# Patient Record
Sex: MALE | Race: WHITE | Employment: OTHER | URBAN - METROPOLITAN AREA
[De-identification: names, ages, dates, MRNs, and addresses within clinical notes are randomized per-mention and may not be internally consistent; named-entity substitution may affect disease eponyms.]

---

## 2023-03-23 ENCOUNTER — APPOINTMENT (OUTPATIENT)
Dept: GENERAL RADIOLOGY | Age: 83
DRG: 517 | End: 2023-03-23
Attending: EMERGENCY MEDICINE
Payer: MEDICARE

## 2023-03-23 ENCOUNTER — APPOINTMENT (OUTPATIENT)
Dept: MRI IMAGING | Age: 83
DRG: 517 | End: 2023-03-23
Attending: FAMILY MEDICINE
Payer: MEDICARE

## 2023-03-23 ENCOUNTER — HOSPITAL ENCOUNTER (INPATIENT)
Age: 83
LOS: 4 days | Discharge: HOME | DRG: 517 | End: 2023-03-27
Attending: EMERGENCY MEDICINE | Admitting: FAMILY MEDICINE
Payer: MEDICARE

## 2023-03-23 ENCOUNTER — APPOINTMENT (OUTPATIENT)
Dept: CT IMAGING | Age: 83
DRG: 517 | End: 2023-03-23
Attending: EMERGENCY MEDICINE
Payer: MEDICARE

## 2023-03-23 DIAGNOSIS — S22.000A COMPRESSION FX, THORACIC SPINE, CLOSED, INITIAL ENCOUNTER (HCC): ICD-10-CM

## 2023-03-23 DIAGNOSIS — S32.030A COMPRESSION FRACTURE OF L3 VERTEBRA, INITIAL ENCOUNTER (HCC): ICD-10-CM

## 2023-03-23 DIAGNOSIS — S32.020A COMPRESSION FRACTURE OF L2 VERTEBRA, INITIAL ENCOUNTER (HCC): Primary | ICD-10-CM

## 2023-03-23 PROBLEM — S32.009A CLOSED FRACTURE DISLOCATION OF LUMBAR SPINE (HCC): Status: ACTIVE | Noted: 2023-03-23

## 2023-03-23 LAB
ALBUMIN SERPL-MCNC: 3.4 G/DL (ref 3.5–5)
ALBUMIN/GLOB SERPL: 0.9 (ref 1.1–2.2)
ALP SERPL-CCNC: 98 U/L (ref 45–117)
ALT SERPL-CCNC: 22 U/L (ref 12–78)
ANION GAP SERPL CALC-SCNC: 1 MMOL/L (ref 5–15)
APPEARANCE UR: CLEAR
AST SERPL-CCNC: 18 U/L (ref 15–37)
BACTERIA URNS QL MICRO: NEGATIVE /HPF
BASOPHILS # BLD: 0 K/UL (ref 0–0.1)
BASOPHILS NFR BLD: 1 % (ref 0–1)
BILIRUB SERPL-MCNC: 0.5 MG/DL (ref 0.2–1)
BILIRUB UR QL: NEGATIVE
BUN SERPL-MCNC: 24 MG/DL (ref 6–20)
BUN/CREAT SERPL: 33 (ref 12–20)
CALCIUM SERPL-MCNC: 9.2 MG/DL (ref 8.5–10.1)
CHLORIDE SERPL-SCNC: 104 MMOL/L (ref 97–108)
CO2 SERPL-SCNC: 34 MMOL/L (ref 21–32)
COLOR UR: ABNORMAL
CREAT SERPL-MCNC: 0.73 MG/DL (ref 0.7–1.3)
DIFFERENTIAL METHOD BLD: ABNORMAL
EOSINOPHIL # BLD: 0.2 K/UL (ref 0–0.4)
EOSINOPHIL NFR BLD: 2 % (ref 0–7)
EPITH CASTS URNS QL MICRO: ABNORMAL /LPF
ERYTHROCYTE [DISTWIDTH] IN BLOOD BY AUTOMATED COUNT: 13.3 % (ref 11.5–14.5)
GLOBULIN SER CALC-MCNC: 3.6 G/DL (ref 2–4)
GLUCOSE SERPL-MCNC: 95 MG/DL (ref 65–100)
GLUCOSE UR STRIP.AUTO-MCNC: NEGATIVE MG/DL
HCT VFR BLD AUTO: 38.9 % (ref 36.6–50.3)
HGB BLD-MCNC: 13 G/DL (ref 12.1–17)
HGB UR QL STRIP: ABNORMAL
HYALINE CASTS URNS QL MICRO: ABNORMAL /LPF (ref 0–5)
IMM GRANULOCYTES # BLD AUTO: 0 K/UL (ref 0–0.04)
IMM GRANULOCYTES NFR BLD AUTO: 0 % (ref 0–0.5)
KETONES UR QL STRIP.AUTO: NEGATIVE MG/DL
LEUKOCYTE ESTERASE UR QL STRIP.AUTO: ABNORMAL
LYMPHOCYTES # BLD: 0.8 K/UL (ref 0.8–3.5)
LYMPHOCYTES NFR BLD: 10 % (ref 12–49)
MCH RBC QN AUTO: 30.1 PG (ref 26–34)
MCHC RBC AUTO-ENTMCNC: 33.4 G/DL (ref 30–36.5)
MCV RBC AUTO: 90 FL (ref 80–99)
MONOCYTES # BLD: 0.7 K/UL (ref 0–1)
MONOCYTES NFR BLD: 8 % (ref 5–13)
NEUTS SEG # BLD: 6.6 K/UL (ref 1.8–8)
NEUTS SEG NFR BLD: 79 % (ref 32–75)
NITRITE UR QL STRIP.AUTO: NEGATIVE
NRBC # BLD: 0 K/UL (ref 0–0.01)
NRBC BLD-RTO: 0 PER 100 WBC
PH UR STRIP: 6.5 (ref 5–8)
PLATELET # BLD AUTO: 254 K/UL (ref 150–400)
PMV BLD AUTO: 10.3 FL (ref 8.9–12.9)
POTASSIUM SERPL-SCNC: 3.3 MMOL/L (ref 3.5–5.1)
PROT SERPL-MCNC: 7 G/DL (ref 6.4–8.2)
PROT UR STRIP-MCNC: NEGATIVE MG/DL
RBC # BLD AUTO: 4.32 M/UL (ref 4.1–5.7)
RBC #/AREA URNS HPF: >100 /HPF (ref 0–5)
SODIUM SERPL-SCNC: 139 MMOL/L (ref 136–145)
SP GR UR REFRACTOMETRY: 1.02 (ref 1–1.03)
UR CULT HOLD, URHOLD: NORMAL
UROBILINOGEN UR QL STRIP.AUTO: 1 EU/DL (ref 0.2–1)
WBC # BLD AUTO: 8.4 K/UL (ref 4.1–11.1)
WBC URNS QL MICRO: ABNORMAL /HPF (ref 0–4)

## 2023-03-23 PROCEDURE — 65270000029 HC RM PRIVATE

## 2023-03-23 PROCEDURE — 81001 URINALYSIS AUTO W/SCOPE: CPT

## 2023-03-23 PROCEDURE — 65270000032 HC RM SEMIPRIVATE

## 2023-03-23 PROCEDURE — 36415 COLL VENOUS BLD VENIPUNCTURE: CPT

## 2023-03-23 PROCEDURE — 74011250637 HC RX REV CODE- 250/637: Performed by: FAMILY MEDICINE

## 2023-03-23 PROCEDURE — 99285 EMERGENCY DEPT VISIT HI MDM: CPT

## 2023-03-23 PROCEDURE — 72100 X-RAY EXAM L-S SPINE 2/3 VWS: CPT

## 2023-03-23 PROCEDURE — 85025 COMPLETE CBC W/AUTO DIFF WBC: CPT

## 2023-03-23 PROCEDURE — 80053 COMPREHEN METABOLIC PANEL: CPT

## 2023-03-23 PROCEDURE — 73562 X-RAY EXAM OF KNEE 3: CPT

## 2023-03-23 PROCEDURE — 72148 MRI LUMBAR SPINE W/O DYE: CPT

## 2023-03-23 PROCEDURE — 70450 CT HEAD/BRAIN W/O DYE: CPT

## 2023-03-23 PROCEDURE — 74011000250 HC RX REV CODE- 250: Performed by: FAMILY MEDICINE

## 2023-03-23 PROCEDURE — 93005 ELECTROCARDIOGRAM TRACING: CPT

## 2023-03-23 RX ORDER — SODIUM CHLORIDE 0.9 % (FLUSH) 0.9 %
5-40 SYRINGE (ML) INJECTION EVERY 8 HOURS
Status: DISCONTINUED | OUTPATIENT
Start: 2023-03-23 | End: 2023-03-27 | Stop reason: HOSPADM

## 2023-03-23 RX ORDER — OXYCODONE HYDROCHLORIDE 5 MG/1
5 TABLET ORAL
Status: DISCONTINUED | OUTPATIENT
Start: 2023-03-23 | End: 2023-03-27 | Stop reason: HOSPADM

## 2023-03-23 RX ORDER — ACETAMINOPHEN 650 MG/1
650 SUPPOSITORY RECTAL
Status: DISCONTINUED | OUTPATIENT
Start: 2023-03-23 | End: 2023-03-27 | Stop reason: HOSPADM

## 2023-03-23 RX ORDER — ONDANSETRON 2 MG/ML
4 INJECTION INTRAMUSCULAR; INTRAVENOUS
Status: DISCONTINUED | OUTPATIENT
Start: 2023-03-23 | End: 2023-03-27 | Stop reason: HOSPADM

## 2023-03-23 RX ORDER — POTASSIUM CHLORIDE 750 MG/1
40 TABLET, FILM COATED, EXTENDED RELEASE ORAL
Status: COMPLETED | OUTPATIENT
Start: 2023-03-23 | End: 2023-03-23

## 2023-03-23 RX ORDER — ACETAMINOPHEN 325 MG/1
650 TABLET ORAL
Status: DISCONTINUED | OUTPATIENT
Start: 2023-03-23 | End: 2023-03-27 | Stop reason: HOSPADM

## 2023-03-23 RX ORDER — SODIUM CHLORIDE 0.9 % (FLUSH) 0.9 %
5-40 SYRINGE (ML) INJECTION AS NEEDED
Status: DISCONTINUED | OUTPATIENT
Start: 2023-03-23 | End: 2023-03-27 | Stop reason: HOSPADM

## 2023-03-23 RX ORDER — ONDANSETRON 4 MG/1
4 TABLET, ORALLY DISINTEGRATING ORAL
Status: DISCONTINUED | OUTPATIENT
Start: 2023-03-23 | End: 2023-03-27 | Stop reason: HOSPADM

## 2023-03-23 RX ORDER — POLYETHYLENE GLYCOL 3350 17 G/17G
17 POWDER, FOR SOLUTION ORAL DAILY PRN
Status: DISCONTINUED | OUTPATIENT
Start: 2023-03-23 | End: 2023-03-27 | Stop reason: HOSPADM

## 2023-03-23 RX ADMIN — POTASSIUM CHLORIDE 40 MEQ: 750 TABLET, FILM COATED, EXTENDED RELEASE ORAL at 14:30

## 2023-03-23 RX ADMIN — SODIUM CHLORIDE, PRESERVATIVE FREE 10 ML: 5 INJECTION INTRAVENOUS at 21:46

## 2023-03-23 NOTE — ED NOTES
RN attempted to straight cath patient for urine but patient had recently urinated in brief. No urine collected.

## 2023-03-23 NOTE — ED TRIAGE NOTES
Pt arrives EMS from [facility] for a GLF with back pain. 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 Arrives in cervical collar and VSS. Feet are both swollen but pt states its ongoing but unsure the cause.  and bp was 123/75 for EMS. A&Ox4. Denied hitting head, LOC, neck pain, or chest pain.

## 2023-03-23 NOTE — ED NOTES
Has a final transfer review been performed?  Yes    Reason for Admission: Compression fractures  Patient comes from: [facility]  Mental Status: Alert and oriented  ADL:partial assistance  Ambulation:wheelchair bound  Pertinent Info/Safety Concerns: Fall risk  COVID Status: Not Tested  MEWS Score: 1  Sinus Rhythm, Sinus Gerhard  Vitals:    03/23/23 1015 03/23/23 1230 03/23/23 1245 03/23/23 1301   BP: 129/72 122/70 110/74 123/65   Pulse:  66 60 (!) 57   Resp:  14 12 12   Temp:       SpO2:  96% 97% 97%     Lines:   Peripheral IV 03/23/23 Anterior;Proximal;Right Forearm (Active)   Site Assessment Clean, dry, & intact 03/23/23 0941   Phlebitis Assessment 0 03/23/23 0941   Infiltration Assessment 0 03/23/23 0941   Dressing Status Clean, dry, & intact 03/23/23 0941   Dressing Type Tape;Transparent 03/23/23 0941   Hub Color/Line Status Pink;Flushed 03/23/23 0941   Action Taken Blood drawn 03/23/23 0941   Alcohol Cap Used No 03/23/23 8398      Transport mode: Stretcher    [employer]  being transferred to [facility] for routine progression of care     \"Notification of etransfer note given to Zoya

## 2023-03-23 NOTE — ED PROVIDER NOTES
this is an 46-year-old male who lives in a nursing home and was attempting to get out of bed this morning. He lost his balance and fell forward. He struck both knees and his left elbow. There is no history of the patient striking his head. He did not lose consciousness and does not complain of any head pain or neck pain. He is alert and oriented x3. He is moving all extremities. He is complaining of pain in both knees and his lower back. He was unable to get up because of pain in the lower back. As long as he is perfectly quiet he is having no discomfort. He denies any chest pain, shortness of breath, nausea or vomiting and no abdominal pain. He had no symptoms prior to attempting to get out of bed that may have precipitated this fall. No past medical history on file. No past surgical history on file. No family history on file. Social History     Socioeconomic History    Marital status:      Spouse name: Not on file    Number of children: Not on file    Years of education: Not on file    Highest education level: Not on file   Occupational History    Not on file   Tobacco Use    Smoking status: Not on file    Smokeless tobacco: Not on file   Substance and Sexual Activity    Alcohol use: Not on file    Drug use: Not on file    Sexual activity: Not on file   Other Topics Concern    Not on file   Social History Narrative    Not on file     Social Determinants of Health     Financial Resource Strain: Not on file   Food Insecurity: Not on file   Transportation Needs: Not on file   Physical Activity: Not on file   Stress: Not on file   Social Connections: Not on file   Intimate Partner Violence: Not on file   Housing Stability: Not on file         ALLERGIES: Patient has no allergy information on record. Review of Systems   Constitutional:  Negative for activity change, appetite change and fatigue. HENT:  Negative for ear pain, facial swelling, sore throat and trouble swallowing. Eyes:  Negative for pain, discharge and visual disturbance. Respiratory:  Negative for chest tightness, shortness of breath and wheezing. Cardiovascular:  Negative for chest pain and palpitations. Gastrointestinal:  Negative for abdominal pain, blood in stool, nausea and vomiting. Genitourinary:  Negative for difficulty urinating, flank pain and hematuria. Musculoskeletal:  Negative for arthralgias, joint swelling, myalgias and neck pain. Pain in both knees and lower back. See HPI   Skin:  Negative for color change and rash. Neurological:  Negative for dizziness, weakness, numbness and headaches. Hematological:  Negative for adenopathy. Does not bruise/bleed easily. Psychiatric/Behavioral:  Negative for behavioral problems, confusion and sleep disturbance. All other systems reviewed and are negative. Vitals:    03/23/23 0810 03/23/23 0811 03/23/23 0830   BP:  127/76 128/72   Pulse: 63 (!) 59 61   Resp: 13 12 13   Temp:  99.5 °F (37.5 °C)    SpO2:  99% 98%            Physical Exam  Vitals and nursing note reviewed. Constitutional:       General: He is not in acute distress. Appearance: He is well-developed. He is not ill-appearing or diaphoretic. HENT:      Head: Normocephalic and atraumatic. Comments: There is no evidence of any head injury or neck injury. Patient has no pain and no restricted motion. He voices no other complaints relative to the head and the neck. Nose: Nose normal.      Mouth/Throat:      Mouth: Mucous membranes are moist.   Eyes:      General: No scleral icterus. Conjunctiva/sclera: Conjunctivae normal.      Pupils: Pupils are equal, round, and reactive to light. Neck:      Thyroid: No thyromegaly. Vascular: No JVD. Trachea: No tracheal deviation. Comments: No carotid bruits noted. Cardiovascular:      Rate and Rhythm: Normal rate and regular rhythm. Heart sounds: Normal heart sounds. No murmur heard.     No friction rub. No gallop. Pulmonary:      Effort: Pulmonary effort is normal. No respiratory distress. Breath sounds: Normal breath sounds. No wheezing or rales. Chest:      Chest wall: No tenderness. Abdominal:      General: Bowel sounds are normal. There is no distension. Palpations: Abdomen is soft. There is no mass. Tenderness: There is no abdominal tenderness. There is no guarding or rebound. Musculoskeletal:         General: No tenderness. Normal range of motion. Cervical back: Normal range of motion and neck supple. Comments: There is some mild discomfort to palpation of the lower back when the patient attempts to move. Lymphadenopathy:      Cervical: No cervical adenopathy. Skin:     General: Skin is warm and dry. Capillary Refill: Capillary refill takes 2 to 3 seconds. Coloration: Skin is pale. Findings: No erythema or rash. Neurological:      Mental Status: He is alert and oriented to person, place, and time. Cranial Nerves: No cranial nerve deficit. Coordination: Coordination normal.      Deep Tendon Reflexes: Reflexes are normal and symmetric. Psychiatric:         Behavior: Behavior normal.         Thought Content: Thought content normal.         Judgment: Judgment normal.        Medical Decision Making  This is an 71-year-old male who presents with fall at the nursing home and trying to get out of bed. He fell forward. He has no history of any head injury or neck injury. He is complaining of pain in both knees. There is no complaint of hip pain. He does have some back pain when he attempts to get up. He voices no other acute complaints presently. There were no complaints prior to trying to get out of bed. We will obtain basic labs and x-rays of the lower back and knees.   We will attempt to have him ambulate after labs and x rays      Problems Addressed:  Compression fracture of L2 vertebra, initial encounter [facility]): acute illness or injury  Compression fracture of L3 vertebra, initial encounter [facility]): acute illness or injury  Compression fx, thoracic spine, closed, initial encounter [facility]): acute illness or injury    Amount and/or Complexity of Data Reviewed  Labs: ordered. Decision-making details documented in ED Course. Radiology: ordered and independent interpretation performed. Decision-making details documented in ED Course. ECG/medicine tests: ordered. Risk  Decision regarding hospitalization. ED Course as of 03/23/23 1206   Thu Mar 23, 2023   1027 EDMD EKG interpretation: There is a sinus rhythm at 63 beats a minute. Axis is normal and intervals appear normal.  No ectopy or acute changes noted [RP]   1151 XR SPINE LUMB 2 OR 3 V [RP]      ED Course User Index  [RP] Aileen Gutierrez MD       Procedures    Patient has a history of an L1 compression fracture in September of last year. There is no indication he had more than 1 at that time. Today he is got compression fractures of T12 as well as L4. He is having back pain when he attempts to get up and states that he is not able to get up and was the reason that he came to the hospital.    Patient is noted on x-ray today to have kyphoplasties done on L1 and L4. He has significant compressions and T12, L2 and to a lesser degree L3. He is unable to sit up on the stretcher at this time. Many movement produces increasing pain in his lower back. It is unclear whether these compression fractures were seen today outside of the ones that have been kyphoplasty are new. He clearly is not able to go back to the facility at this time. We will consult Ortho in the hospitalist.  He may require kyphoplasty more acutely if these are new fractures. Perfect Serve Consult for Admission  12:13 PM    ED Room Number: ER09/09  Patient Name and age: Raymundo Patel 81 y.o.  male  Working Diagnosis:   1. Compression fracture of L2 vertebra, initial encounter ([address]    2.  Compression fracture of L3 vertebra, initial encounter ([facility] [address]    3.  Compression fx, thoracic spine, closed, initial encounter ([facility] [address]        COVID-19 Suspicion:  no  Sepsis present:  no  Reassessment needed: no  Code Status:  Full Code  Readmission: no  Isolation Requirements:  no  Recommended Level of Care:  med/surg  Department: [facility] Adult ED - [phone]  Admitting Provider: Hospitalist    Other:  Ortho consulted/ Patient unable to get off bed

## 2023-03-23 NOTE — H&P
History and Physical    Date of Service:  3/23/2023  Primary Care Provider: None  Source of information: The patient and Chart review    Chief Complaint: Fall      History of Presenting Illness:   Gino Thornton is a 81 y.o. male who presents from his long-term care facility after he fell while attempting to get out of bed. Patient lost his balance and fell forward, he struck both knees as well as left elbow. Denies any head injury, denies any loss of consciousness. Patient presented to the emergency room for further evaluation, imaging includes CT of the head which shows no acute intracranial pathology, x-ray of both knees negative for acute fracture or dislocation. Lumbar spine x-ray shows T12, L2 and L3 compression fracture, age indeterminate. In the ER, while attempting to mobilize the patient, patient with significant pain and unable to move. Hospitalist service requested admit the patient for further evaluation management. The patient denies any headache, blurry vision, sore throat, trouble swallowing, trouble with speech, chest pain, SOB, cough, fever, chills, N/V/D, abd pain, urinary symptoms, constipation, recent travels, sick contacts, focal or generalized neurological symptoms, falls, injuries, rashes, contact with COVID-19 diagnosed patients, hematemesis, melena, hemoptysis, hematuria, rashes, denies starting any new medications and denies any other concerns or problems besides as mentioned above. REVIEW OF SYSTEMS:  A comprehensive review of systems was negative except for that written in the History of Present Illness. No past medical history on file. No past surgical history on file. Prior to Admission medications    Not on File     Not on File   No family history on file.    Social History:     Social Determinants of Health     Tobacco Use: Not on file   Alcohol Use: Not on file   Financial Resource Strain: Not on file   Food Insecurity: Not on file   Transportation Needs: Not on file   Physical Activity: Not on file   Stress: Not on file   Social Connections: Not on file   Intimate Partner Violence: Not on file   Depression: Not on file   Housing Stability: Not on file        Medications were reconciled to the best of my ability given all available resources at the time of admission. Route is PO if not otherwise noted. Family and social history were personally reviewed, all pertinent and relevant details are outlined as above.     Objective:   Visit Vitals  /72   Pulse 61   Temp 99.5 °F (37.5 °C)   Resp 13   SpO2 98%      O2 Device: None (Room air)    PHYSICAL EXAM:   General: Alert x oriented x 3, awake, no acute distress,   HEENT: PEERL, EOMI, moist mucus membranes  Neck: Supple, no JVD, no meningeal signs  Chest: Clear to auscultation bilaterally   CVS: RRR, S1 S2 heard, no murmurs/rubs/gallops  Abd: Soft, non-tender, non-distended, +bowel sounds   Ext: No clubbing, no cyanosis, no edema  Neuro/Psych: Pleasant mood and affect, CN 2-12 grossly intact, sensory grossly within normal limit, Strength 5/5 in all extremities, DTR 1+ x 4  Cap refill: Brisk, less than 3 seconds  Pulses: 2+, symmetric in all extremities  Skin: Warm, dry, without rashes or lesions    Data Review:   I have independently reviewed and interpreted patient's lab and all other diagnostic data    Abnormal Labs Reviewed   CBC WITH AUTOMATED DIFF - Abnormal; Notable for the following components:       Result Value    NEUTROPHILS 79 (*)     LYMPHOCYTES 10 (*)     All other components within normal limits   METABOLIC PANEL, COMPREHENSIVE - Abnormal; Notable for the following components:    Potassium 3.3 (*)     CO2 34 (*)     Anion gap 1 (*)     BUN 24 (*)     BUN/Creatinine ratio 33 (*)     Albumin 3.4 (*)     A-G Ratio 0.9 (*)     All other components within normal limits       All Micro Results       None            IMAGING:   XR SPINE LUMB 2 OR 3 V   Final Result   T12, L2, and L3 compression fractures, age indeterminate. XR KNEE RT 3 V   Final Result   No fracture or dislocation. Right distal femur hardware noted. .      XR KNEE LT 3 V   Final Result   No fracture or dislocation. Right distal femur hardware noted. .      CT HEAD WO CONT   Final Result   No acute intracranial hemorrhage or infarct. ECG/ECHO:    Results for orders placed or performed during the hospital encounter of 03/23/23   EKG, 12 LEAD, INITIAL   Result Value Ref Range    Ventricular Rate 63 BPM    Atrial Rate 63 BPM    P-R Interval 148 ms    QRS Duration 86 ms    Q-T Interval 392 ms    QTC Calculation (Bezet) 401 ms    Calculated P Axis 39 degrees    Calculated R Axis 30 degrees    Calculated T Axis 23 degrees    Diagnosis       Normal sinus rhythm  Nonspecific T wave abnormality  Abnormal ECG  No previous ECGs available            Notes reviewed from all clinical/nonclinical/nursing services involved in patient's clinical care. Care coordination discussions were held with appropriate clinical/nonclinical/ nursing providers based on care coordination needs. Assessment:   Given the patient's current clinical presentation, there is a high level of concern for decompensation if discharged from the emergency department. Complex decision making was performed, which includes reviewing the patient's available past medical records, laboratory results, and imaging studies.     Active Problems:    Closed fracture dislocation of lumbar spine ([facility] [address] (3/23/2023)        Plan:     S/p mechanical fall  -Work-up include CT of the head, bilateral knee which shows no acute pathology  -Lumbar spine x-ray shows T12, L2 and L3 compression fractures age-indeterminate  -Patient with significant pain and unable to mobilize  -Admit for pain control, PT to evaluate when patient able to participate    Spinal compression fracture  -Age indeterminate compression fractures of the T12, L2 and L3  -Pain management, check MRI of the spine to evaluate for acuity of the fracture  -If acute fracture identified, consult IR for possible kyphoplasty  -Continue pain management, mobilize as able with PT    Hypokalemia  -Replace potassium    No other medical history noted in the chart and also medication list not available at this time. We will review medications once med rec is completed. Estimated length of stay is 2 midnights. DIET: No diet orders on file   ISOLATION PRECAUTIONS: There are currently no Active Isolations  CODE STATUS: No Order   DVT PROPHYLAXIS: SCDs  FUNCTIONAL STATUS PRIOR TO HOSPITALIZATION: Fully active and ambulatory; able to carry on all self-care without restriction. Ambulatory status/function: By self   EARLY MOBILITY ASSESSMENT: Recommend an assessment from physical therapy and/or occupational therapy  ANTICIPATED DISCHARGE: 24-48 hours. ANTICIPATED DISPOSITION: LTC  EMERGENCY CONTACT/SURROGATE DECISION MAKER:     CRITICAL CARE WAS PERFORMED FOR THIS ENCOUNTER: NO.      Signed By: Esther Lozano MD     March 23, 2023         Please note that this dictation may have been completed with Dragon, the computer voice recognition software. Quite often unanticipated grammatical, syntax, homophones, and other interpretive errors are inadvertently transcribed by the computer software. Please disregard these errors. Please excuse any errors that have escaped final proofreading.

## 2023-03-24 ENCOUNTER — HOSPITAL ENCOUNTER (INPATIENT)
Dept: INTERVENTIONAL RADIOLOGY/VASCULAR | Age: 83
Discharge: HOME | DRG: 517 | End: 2023-03-24
Attending: PHYSICIAN ASSISTANT
Payer: MEDICARE

## 2023-03-24 VITALS
SYSTOLIC BLOOD PRESSURE: 94 MMHG | DIASTOLIC BLOOD PRESSURE: 57 MMHG | TEMPERATURE: 98.1 F | OXYGEN SATURATION: 96 % | HEART RATE: 57 BPM | RESPIRATION RATE: 8 BRPM

## 2023-03-24 LAB
INR PPP: 1.1 (ref 0.9–1.1)
PROTHROMBIN TIME: 11.1 SEC (ref 9–11.1)

## 2023-03-24 PROCEDURE — 2709999900 HC NON-CHARGEABLE SUPPLY

## 2023-03-24 PROCEDURE — 65270000029 HC RM PRIVATE

## 2023-03-24 PROCEDURE — 74011000250 HC RX REV CODE- 250: Performed by: FAMILY MEDICINE

## 2023-03-24 PROCEDURE — 74011000250 HC RX REV CODE- 250: Performed by: STUDENT IN AN ORGANIZED HEALTH CARE EDUCATION/TRAINING PROGRAM

## 2023-03-24 PROCEDURE — 36415 COLL VENOUS BLD VENIPUNCTURE: CPT

## 2023-03-24 PROCEDURE — 77030021783 HC SYS CEM DEL MEDT -D

## 2023-03-24 PROCEDURE — 85610 PROTHROMBIN TIME: CPT

## 2023-03-24 PROCEDURE — 0QU03JZ SUPPLEMENT LUMBAR VERTEBRA WITH SYNTHETIC SUBSTITUTE, PERCUTANEOUS APPROACH: ICD-10-PCS | Performed by: STUDENT IN AN ORGANIZED HEALTH CARE EDUCATION/TRAINING PROGRAM

## 2023-03-24 PROCEDURE — 77030007884 HC KT SPN FX KYPH -I2

## 2023-03-24 PROCEDURE — 77030034843 HC TAMP SPN BN INFL EXP II KYPH -H

## 2023-03-24 PROCEDURE — 0QS03ZZ REPOSITION LUMBAR VERTEBRA, PERCUTANEOUS APPROACH: ICD-10-PCS | Performed by: STUDENT IN AN ORGANIZED HEALTH CARE EDUCATION/TRAINING PROGRAM

## 2023-03-24 PROCEDURE — C1713 ANCHOR/SCREW BN/BN,TIS/BN: HCPCS

## 2023-03-24 PROCEDURE — 77030003666 HC NDL SPINAL BD -A

## 2023-03-24 PROCEDURE — 65270000032 HC RM SEMIPRIVATE

## 2023-03-24 PROCEDURE — 74011000636 HC RX REV CODE- 636: Performed by: STUDENT IN AN ORGANIZED HEALTH CARE EDUCATION/TRAINING PROGRAM

## 2023-03-24 PROCEDURE — 77030021782 HC SYS CEM CART DEL KYPH -C

## 2023-03-24 PROCEDURE — 22515 PERQ VERTEBRAL AUGMENTATION: CPT

## 2023-03-24 PROCEDURE — 74011250636 HC RX REV CODE- 250/636: Performed by: STUDENT IN AN ORGANIZED HEALTH CARE EDUCATION/TRAINING PROGRAM

## 2023-03-24 RX ORDER — FLUMAZENIL 0.1 MG/ML
0.5 INJECTION INTRAVENOUS
Status: DISCONTINUED | OUTPATIENT
Start: 2023-03-24 | End: 2023-03-24 | Stop reason: ALTCHOICE

## 2023-03-24 RX ORDER — SODIUM CHLORIDE 9 MG/ML
25 INJECTION, SOLUTION INTRAVENOUS
Status: DISCONTINUED | OUTPATIENT
Start: 2023-03-24 | End: 2023-03-24 | Stop reason: ALTCHOICE

## 2023-03-24 RX ORDER — KETOROLAC TROMETHAMINE 30 MG/ML
15-30 INJECTION, SOLUTION INTRAMUSCULAR; INTRAVENOUS
Status: COMPLETED | OUTPATIENT
Start: 2023-03-24 | End: 2023-03-24

## 2023-03-24 RX ORDER — LIDOCAINE HYDROCHLORIDE 10 MG/ML
30 INJECTION INFILTRATION; PERINEURAL
Status: COMPLETED | OUTPATIENT
Start: 2023-03-24 | End: 2023-03-24

## 2023-03-24 RX ORDER — BUPIVACAINE HYDROCHLORIDE 5 MG/ML
30 INJECTION, SOLUTION EPIDURAL; INTRACAUDAL
Status: COMPLETED | OUTPATIENT
Start: 2023-03-24 | End: 2023-03-24

## 2023-03-24 RX ORDER — NALOXONE HYDROCHLORIDE 0.4 MG/ML
0.4 INJECTION, SOLUTION INTRAMUSCULAR; INTRAVENOUS; SUBCUTANEOUS
Status: DISCONTINUED | OUTPATIENT
Start: 2023-03-24 | End: 2023-03-24 | Stop reason: ALTCHOICE

## 2023-03-24 RX ORDER — FENTANYL CITRATE 50 UG/ML
25-200 INJECTION, SOLUTION INTRAMUSCULAR; INTRAVENOUS
Status: DISCONTINUED | OUTPATIENT
Start: 2023-03-24 | End: 2023-03-24 | Stop reason: ALTCHOICE

## 2023-03-24 RX ORDER — MIDAZOLAM HYDROCHLORIDE 1 MG/ML
.5-5 INJECTION, SOLUTION INTRAMUSCULAR; INTRAVENOUS
Status: DISCONTINUED | OUTPATIENT
Start: 2023-03-24 | End: 2023-03-24 | Stop reason: ALTCHOICE

## 2023-03-24 RX ORDER — DIPHENHYDRAMINE HYDROCHLORIDE 50 MG/ML
25-50 INJECTION, SOLUTION INTRAMUSCULAR; INTRAVENOUS
Status: COMPLETED | OUTPATIENT
Start: 2023-03-24 | End: 2023-03-24

## 2023-03-24 RX ADMIN — DIPHENHYDRAMINE HYDROCHLORIDE 50 MG: 50 INJECTION, SOLUTION INTRAMUSCULAR; INTRAVENOUS at 13:20

## 2023-03-24 RX ADMIN — MIDAZOLAM 1.5 MG: 1 INJECTION INTRAMUSCULAR; INTRAVENOUS at 14:02

## 2023-03-24 RX ADMIN — SODIUM CHLORIDE 25 ML/HR: 9 INJECTION, SOLUTION INTRAVENOUS at 13:14

## 2023-03-24 RX ADMIN — CEFAZOLIN 2 G: 1 INJECTION, POWDER, FOR SOLUTION INTRAMUSCULAR; INTRAVENOUS at 13:15

## 2023-03-24 RX ADMIN — MIDAZOLAM 1 MG: 1 INJECTION INTRAMUSCULAR; INTRAVENOUS at 14:11

## 2023-03-24 RX ADMIN — KETOROLAC TROMETHAMINE 15 MG: 30 INJECTION, SOLUTION INTRAMUSCULAR; INTRAVENOUS at 13:20

## 2023-03-24 RX ADMIN — FENTANYL CITRATE 25 MCG: 50 INJECTION, SOLUTION INTRAMUSCULAR; INTRAVENOUS at 14:23

## 2023-03-24 RX ADMIN — SODIUM CHLORIDE, PRESERVATIVE FREE 10 ML: 5 INJECTION INTRAVENOUS at 07:10

## 2023-03-24 RX ADMIN — MIDAZOLAM 0.5 MG: 1 INJECTION INTRAMUSCULAR; INTRAVENOUS at 14:23

## 2023-03-24 RX ADMIN — LIDOCAINE HYDROCHLORIDE 15 ML: 10 INJECTION, SOLUTION INFILTRATION; PERINEURAL at 14:52

## 2023-03-24 RX ADMIN — SODIUM CHLORIDE, PRESERVATIVE FREE 10 ML: 5 INJECTION INTRAVENOUS at 21:12

## 2023-03-24 RX ADMIN — FENTANYL CITRATE 25 MCG: 50 INJECTION, SOLUTION INTRAMUSCULAR; INTRAVENOUS at 14:07

## 2023-03-24 RX ADMIN — MIDAZOLAM 0.5 MG: 1 INJECTION INTRAMUSCULAR; INTRAVENOUS at 14:08

## 2023-03-24 RX ADMIN — IOHEXOL 40 ML: 180 INJECTION INTRAVENOUS at 14:24

## 2023-03-24 RX ADMIN — FENTANYL CITRATE 50 MCG: 50 INJECTION, SOLUTION INTRAMUSCULAR; INTRAVENOUS at 14:02

## 2023-03-24 RX ADMIN — FENTANYL CITRATE 25 MCG: 50 INJECTION, SOLUTION INTRAMUSCULAR; INTRAVENOUS at 14:16

## 2023-03-24 RX ADMIN — BUPIVACAINE HYDROCHLORIDE 15 ML: 5 INJECTION, SOLUTION EPIDURAL; INTRACAUDAL; PERINEURAL at 14:52

## 2023-03-24 NOTE — PROGRESS NOTES
Problem: Falls - Risk of  Goal: *Absence of Falls  Description: Document Ulysses Sheldon Fall Risk and appropriate interventions in the flowsheet. Outcome: Progressing Towards Goal  Note: Fall Risk Interventions:                                Problem: Falls - Risk of  Goal: *Absence of Falls  Description: Document Gladis Fall Risk and appropriate interventions in the flowsheet.   Outcome: Progressing Towards Goal  Note: Fall Risk Interventions:

## 2023-03-24 NOTE — PROGRESS NOTES
Problem: Falls - Risk of  Goal: *Absence of Falls  Description: Document Sherry Corea Fall Risk and appropriate interventions in the flowsheet. Outcome: Progressing Towards Goal  Note: Fall Risk Interventions:     Call bell within reach. Bed wheels locked. Side rails up X3    Gripper socks to bilateral feet when out of bed.

## 2023-03-24 NOTE — PROGRESS NOTES
[address] [facility] Adult  Hospitalist Group                                                                                          Hospitalist Progress Note  Jenn[place]ert [place]  Answering service: [phone] from in house phone        Date of Service:  3/24/2023  NAME:  James Godoy  :  1940  MRN:  [id number]      Admission Summary:   James Godoy is a 81 y.o. male who presents from his long-term care facility after he fell while attempting to get out of bed. Patient lost his balance and fell forward, he struck both knees as well as left elbow. Denies any head injury, denies any loss of consciousness. Patient presented to the emergency room for further evaluation, imaging includes CT of the head which shows no acute intracranial pathology, x-ray of both knees negative for acute fracture or dislocation. Lumbar spine x-ray shows T12, L2 and L3 compression fracture, age indeterminate. In the ER, while attempting to mobilize the patient, patient with significant pain and unable to move. Hospitalist service requested admit the patient for further evaluation management       Interval history / Subjective:      Saw the patient on rounds, he was being prepared for transport to  for kyphoplasty.  He endorses some lumbar back pain that is exacerbated with movement and alleviated by rest. He denies any saddle paraesthesia, loss of control of bowel or bladder, numbness or tingling    Per patient, his son will be visiting later today and will have the patients current medication list.   Assessment & Plan:      S/p mechanical fall  -Work-up included CT of the head, bilateral knee which shows no acute pathology  - Lumbar spine x-ray shows T12, L2 and L3 compression fractures age-indeterminate  - Patient with significant pain and unable to mobilize     Spinal compression fracture  - Age indeterminate compression fractures of the T12, L2 and L3  - MRI Spine: Acute versus subacute partial compression fractures of T12, L2, L3, and L4.  - IR consulted for kyphoplasty  - PRN pain management  - PT/OT     Hypokalemia  -Replace potassium     Code status: Full  Prophylaxis: SCDs  Care Plan discussed with: Patient, RN, CM, Attending  Anticipated Disposition: Back to [facility] following kyphoplasty, PT/OT     Hospital Problems  Never Reviewed            Codes Class Noted POA    Closed fracture dislocation of lumbar spine [facility]) ICD-10-CM: S32.009A  ICD-9-CM: 805.4  3/23/2023 Unknown             Review of Systems:   A comprehensive review of systems was negative except for that written in the HPI. Vital Signs:    Last 24hrs VS reviewed since prior progress note. Most recent are:  Visit Vitals  BP (P) 118/68   Pulse (P) 70   Temp (P) 98.8 °F (37.1 °C)   Resp (P) 16   Ht 5' 9\" (1.753 m)   Wt 59 kg (130 lb)   SpO2 (P) 95%   BMI 19.20 kg/m²         Intake/Output Summary (Last 24 hours) at 3/24/2023 1139  Last data filed at 3/24/2023 0630  Gross per 24 hour   Intake 120 ml   Output 251 ml   Net -131 ml        Physical Examination:     I had a face to face encounter with this patient and independently examined them on 3/24/2023 as outlined below:          Constitutional:  No acute distress, cooperative, pleasant    ENT:  Oral mucosa moist, oropharynx benign. Resp:  CTA bilaterally. No wheezing/rhonchi/rales. No accessory muscle use. CV:  Regular rhythm, normal rate, no murmurs, gallops, rubs    GI:  Soft, non distended, non tender. normoactive bowel sounds,    Musculoskeletal:  No edema, warm, 2+ pulses throughout    Neurologic:  Moves all extremities.   AAOx3, CN II-XII grossly intact, sensation grossly intact            Data Review:    Review and/or order of clinical lab test  Review and/or order of tests in the radiology section of CPT  Review and/or order of tests in the medicine section of CPT      Labs:     Recent Labs     03/23/23  0927   WBC 8.4   HGB 13.0   HCT 38.9        Recent Labs 03/23/23 0927      K 3.3*      CO2 34*   BUN 24*   CREA 0.73   GLU 95   CA 9.2     Recent Labs     03/23/23 0927   ALT 22   AP 98   TBILI 0.5   TP 7.0   ALB 3.4*   GLOB 3.6     No results for input(s): INR, PTP, APTT, INREXT in the last 72 hours. No results for input(s): FE, TIBC, PSAT, FERR in the last 72 hours. No results found for: FOL, RBCF   No results for input(s): PH, PCO2, PO2 in the last 72 hours. No results for input(s): CPK, CKNDX, TROIQ in the last 72 hours.     No lab exists for component: CPKMB  No results found for: CHOL, CHOLX, CHLST, CHOLV, HDL, HDLP, LDL, LDLC, DLDLP, TGLX, TRIGL, TRIGP, CHHD, CHHDX  No results found for: [facility]  Lab Results   Component Value Date/Time    Color YELLOW/STRAW 03/23/2023 09:27 AM    Appearance CLEAR 03/23/2023 09:27 AM    Specific gravity 1.022 03/23/2023 09:27 AM    pH (UA) 6.5 03/23/2023 09:27 AM    Protein Negative 03/23/2023 09:27 AM    Glucose Negative 03/23/2023 09:27 AM    Ketone Negative 03/23/2023 09:27 AM    Bilirubin Negative 03/23/2023 09:27 AM    Urobilinogen 1.0 03/23/2023 09:27 AM    Nitrites Negative 03/23/2023 09:27 AM    Leukocyte Esterase TRACE (A) 03/23/2023 09:27 AM    Epithelial cells FEW 03/23/2023 09:27 AM    Bacteria Negative 03/23/2023 09:27 AM    WBC 5-10 03/23/2023 09:27 AM    RBC >100 (H) 03/23/2023 09:27 AM         Medications Reviewed:     Current Facility-Administered Medications   Medication Dose Route Frequency    sodium chloride (NS) flush 5-40 mL  5-40 mL IntraVENous Q8H    sodium chloride (NS) flush 5-40 mL  5-40 mL IntraVENous PRN    acetaminophen (TYLENOL) tablet 650 mg  650 mg Oral Q6H PRN    Or    acetaminophen (TYLENOL) suppository 650 mg  650 mg Rectal Q6H PRN    polyethylene glycol (MIRALAX) packet 17 g  17 g Oral DAILY PRN    ondansetron (ZOFRAN ODT) tablet 4 mg  4 mg Oral Q8H PRN    Or    ondansetron (ZOFRAN) injection 4 mg  4 mg IntraVENous Q6H PRN    oxyCODONE IR (ROXICODONE) tablet 5 mg  5 mg Oral Q4H PRN     ______________________________________________________________________  EXPECTED LENGTH OF STAY: - - -  ACTUAL LENGTH OF STAY:          1                 Zoanne Silvers Milligram, PA-C

## 2023-03-24 NOTE — ROUTINE PROCESS
Pt arrives via bed to angio department accompanied by self for kypho procedure. All assessments completed and consent was reviewed. Education given was regarding procedure, moderate sedation, post-procedure care and  management/follow-up. Opportunity for questions was provided and all questions and concerns were addressed.

## 2023-03-24 NOTE — PROGRESS NOTES
Report given to day shift Nurse DARRIN Caro. Report included SBAR, MAR, KARDEX, LAB RESULTS, INTAKE/OUTPUT.

## 2023-03-24 NOTE — ROUTINE PROCESS
TRANSFER - OUT REPORT:    Verbal report given to [facility] RN(name) on Ivania Naylor  being transferred to (unit) for routine progression of care       Report consisted of patients Situation, Background, Assessment and   Recommendations(SBAR). Information from the following report(s) SBAR, Kardex, and Procedure Summary was reviewed with the receiving nurse. Lines:   Peripheral IV 03/23/23 Anterior;Proximal;Right Forearm (Active)   Site Assessment Clean, dry, & intact 03/24/23 0810   Phlebitis Assessment 0 03/24/23 0810   Infiltration Assessment 0 03/24/23 0810   Dressing Status Clean, dry, & intact 03/24/23 0810   Dressing Type Transparent 03/24/23 0810   Hub Color/Line Status Capped 03/24/23 0810   Action Taken Open ports on tubing capped 03/24/23 0810   Alcohol Cap Used Yes 03/24/23 0810        Opportunity for questions and clarification was provided.       Patient transported with:   Tagged

## 2023-03-25 PROCEDURE — 74011250637 HC RX REV CODE- 250/637: Performed by: FAMILY MEDICINE

## 2023-03-25 PROCEDURE — 74011250637 HC RX REV CODE- 250/637: Performed by: PHYSICIAN ASSISTANT

## 2023-03-25 PROCEDURE — 74011000250 HC RX REV CODE- 250: Performed by: FAMILY MEDICINE

## 2023-03-25 PROCEDURE — 65270000029 HC RM PRIVATE

## 2023-03-25 RX ORDER — MIRTAZAPINE 15 MG/1
15 TABLET, FILM COATED ORAL
Status: DISCONTINUED | OUTPATIENT
Start: 2023-03-25 | End: 2023-03-27 | Stop reason: HOSPADM

## 2023-03-25 RX ORDER — ATORVASTATIN CALCIUM 20 MG/1
20 TABLET, FILM COATED ORAL DAILY
Status: DISCONTINUED | OUTPATIENT
Start: 2023-03-25 | End: 2023-03-27 | Stop reason: HOSPADM

## 2023-03-25 RX ORDER — ESCITALOPRAM OXALATE 10 MG/1
10 TABLET ORAL DAILY
Status: DISCONTINUED | OUTPATIENT
Start: 2023-03-25 | End: 2023-03-27 | Stop reason: HOSPADM

## 2023-03-25 RX ORDER — SENNOSIDES 8.6 MG/1
1 TABLET ORAL DAILY PRN
Status: DISCONTINUED | OUTPATIENT
Start: 2023-03-25 | End: 2023-03-27 | Stop reason: HOSPADM

## 2023-03-25 RX ADMIN — ESCITALOPRAM 10 MG: 10 TABLET, FILM COATED ORAL at 12:03

## 2023-03-25 RX ADMIN — MIRTAZAPINE 15 MG: 15 TABLET, FILM COATED ORAL at 22:51

## 2023-03-25 RX ADMIN — POLYETHYLENE GLYCOL 3350 17 G: 17 POWDER, FOR SOLUTION ORAL at 05:04

## 2023-03-25 RX ADMIN — ATORVASTATIN CALCIUM 20 MG: 20 TABLET, FILM COATED ORAL at 12:03

## 2023-03-25 RX ADMIN — SODIUM CHLORIDE, PRESERVATIVE FREE 10 ML: 5 INJECTION INTRAVENOUS at 22:51

## 2023-03-25 RX ADMIN — SODIUM CHLORIDE, PRESERVATIVE FREE 10 ML: 5 INJECTION INTRAVENOUS at 05:04

## 2023-03-25 NOTE — PROGRESS NOTES
-Transition of Care Plan  RUR- Low  11%  DISPOSITION: CM will need to follow PT/ OT and MD for dispo, patient lives in [place] [address] at [facility]  F/U with PCP/Specialist  Transport versus family  IM completed  Mel[place] [address] phone number for facility is [phone]        Reason for Admission:  closed fracture                     RUR Score:          11%           Plan for utilizing home health:      TBD    PCP: Facility PCP at this point                    Current Advanced Directive/Advance Care Plan: Full Code      Healthcare Decision Maker:   Click here to complete [address] including selection of the Healthcare Decision Maker Relationship (ie \"Primary\")                             Transition of Care Plan: This CM met with patient and son at bedside and explained CM role, confirmed address and family contact as bonita Sun reachable at [phone]. Per son, patient was just admitted to [facility], patient indicated that he is in a wheel chair and requires assistance with ADL care and changing briefs. PT/ OT therapy assessments are pending    CM to follow for needs. Care Management Interventions  PCP Verified by CM:  Yes  Palliative Care Criteria Met (RRAT>21 & CHF Dx)?: No  Mode of Transport at Discharge:  (stretcher versus family transport)  Speech Therapy Consult:  (TBD)  Support Systems: Assisted Living  Confirm Follow Up Transport: Family  Discharge Location  Patient Expects to be Discharged to[de-identified]  (TBD/ CM will need to follow)  SUDHIR Castillo, CRM  11:28 AM

## 2023-03-25 NOTE — PROGRESS NOTES
[facility] Adult  Hospitalist Group                                                                                          Hospitalist Progress Note  [facility] [place]  Answering service: [phone] OR 15 from in house phone        Date of Service:  3/25/2023  NAME:  Trisha Ordonez  :  1940  MRN:  [id number]      Admission Summary:   Tirsha Ordonez is a 81 y.o. male who presents from his long-term care facility after he fell while attempting to get out of bed. Patient lost his balance and fell forward, he struck both knees as well as left elbow. Denies any head injury, denies any loss of consciousness. Patient presented to the emergency room for further evaluation, imaging includes CT of the head which shows no acute intracranial pathology, x-ray of both knees negative for acute fracture or dislocation. Lumbar spine x-ray shows T12, L2 and L3 compression fracture, age indeterminate. In the ER, while attempting to mobilize the patient, patient with significant pain and unable to move. Hospitalist service requested admit the patient for further evaluation management       Interval history / Subjective:      Obtained Patient's PTA med list from family member, resumed home lexapro, mirtazapine, statin    Underwent kyphoplasty yesterday, PT to see the patient.  Patient otherwise medically stable, can return to [facility] pending PT eval, likely monday  Assessment & Plan:      S/p mechanical fall  -Work-up included CT of the head, bilateral knee which shows no acute pathology  - Lumbar spine x-ray shows T12, L2 and L3 compression fractures age-indeterminate  - Patient with significant pain and unable to mobilize     Spinal compression fracture  - Age indeterminate compression fractures of the T12, L2 and L3  - MRI Spine: Acute versus subacute partial compression fractures of T12, L2, L3, and L4.  - IR consulted for kyphoplasty  - PRN pain management  - PT/OT     Hypokalemia  -Replace potassium    HLD  - Continue Statin    Mood disorder  - Escitalopram 10mg daily  - Mirtazapine QHS    Code status: Full  Prophylaxis: SCDs  Care Plan discussed with: Patient, RN, Attending  Anticipated Disposition: Back to assisted following kyphoplasty, PT/OT     Hospital Problems  Never Reviewed            Codes Class Noted POA    Closed fracture dislocation of lumbar spine [facility]) ICD-10-CM: S32.009A  ICD-9-CM: 805.4  3/23/2023 Unknown           Review of Systems:   A comprehensive review of systems was negative except for that written in the HPI. Vital Signs:    Last 24hrs VS reviewed since prior progress note. Most recent are:  Visit Vitals  /73   Pulse 62   Temp 97.9 °F (36.6 °C)   Resp 16   Ht 5' 9\" (1.753 m)   Wt 59 kg (130 lb)   SpO2 96%   BMI 19.20 kg/m²       No intake or output data in the 24 hours ending 03/25/23 0949       Physical Examination:     I had a face to face encounter with this patient and independently examined them on 3/25/2023 as outlined below:          Constitutional:  No acute distress, cooperative, pleasant    ENT:  Oral mucosa moist, oropharynx benign. Resp:  CTA bilaterally. No wheezing/rhonchi/rales. No accessory muscle use. CV:  Regular rhythm, normal rate, no murmurs, gallops, rubs    GI:  Soft, non distended, non tender. normoactive bowel sounds,    Musculoskeletal:  No edema, warm, 2+ pulses throughout    Neurologic:  Moves all extremities.   AAOx3, CN II-XII grossly intact, sensation grossly intact            Data Review:    Review and/or order of clinical lab test  Review and/or order of tests in the radiology section of CPT  Review and/or order of tests in the medicine section of CPT      Labs:     Recent Labs     03/23/23 0927   WBC 8.4   HGB 13.0   HCT 38.9          Recent Labs     03/23/23 0927      K 3.3*      CO2 34*   BUN 24*   CREA 0.73   GLU 95   CA 9.2       Recent Labs     03/23/23 0927   ALT 22   AP 98   TBILI 0.5   TP 7.0   ALB 3.4*   GLOB 3.6       Recent Labs     03/24/23  1119   INR 1.1   PTP 11.1        No results for input(s): FE, TIBC, PSAT, FERR in the last 72 hours. No results found for: FOL, RBCF   No results for input(s): PH, PCO2, PO2 in the last 72 hours. No results for input(s): CPK, CKNDX, TROIQ in the last 72 hours.     No lab exists for component: CPKMB  No results found for: CHOL, CHOLX, CHLST, CHOLV, HDL, HDLP, LDL, LDLC, DLDLP, TGLX, TRIGL, TRIGP, CHHD, CHHDX  No results found for: [facility]  Lab Results   Component Value Date/Time    Color YELLOW/STRAW 03/23/2023 09:27 AM    Appearance CLEAR 03/23/2023 09:27 AM    Specific gravity 1.022 03/23/2023 09:27 AM    pH (UA) 6.5 03/23/2023 09:27 AM    Protein Negative 03/23/2023 09:27 AM    Glucose Negative 03/23/2023 09:27 AM    Ketone Negative 03/23/2023 09:27 AM    Bilirubin Negative 03/23/2023 09:27 AM    Urobilinogen 1.0 03/23/2023 09:27 AM    Nitrites Negative 03/23/2023 09:27 AM    Leukocyte Esterase TRACE (A) 03/23/2023 09:27 AM    Epithelial cells FEW 03/23/2023 09:27 AM    Bacteria Negative 03/23/2023 09:27 AM    WBC 5-10 03/23/2023 09:27 AM    RBC >100 (H) 03/23/2023 09:27 AM         Medications Reviewed:     Current Facility-Administered Medications   Medication Dose Route Frequency    escitalopram oxalate (LEXAPRO) tablet 10 mg  10 mg Oral DAILY    atorvastatin (LIPITOR) tablet 20 mg  20 mg Oral DAILY    mirtazapine (REMERON) tablet 15 mg  15 mg Oral QHS    senna (SENOKOT) tablet 8.6 mg  1 Tablet Oral DAILY PRN    sodium chloride (NS) flush 5-40 mL  5-40 mL IntraVENous Q8H    sodium chloride (NS) flush 5-40 mL  5-40 mL IntraVENous PRN    acetaminophen (TYLENOL) tablet 650 mg  650 mg Oral Q6H PRN    Or    acetaminophen (TYLENOL) suppository 650 mg  650 mg Rectal Q6H PRN    polyethylene glycol (MIRALAX) packet 17 g  17 g Oral DAILY PRN    ondansetron (ZOFRAN ODT) tablet 4 mg  4 mg Oral Q8H PRN    Or    ondansetron (ZOFRAN) injection 4 mg  4 mg IntraVENous Q6H PRN oxyCODONE IR (ROXICODONE) tablet 5 mg  5 mg Oral Q4H PRN     ______________________________________________________________________  EXPECTED LENGTH OF STAY: - - -  ACTUAL LENGTH OF STAY:          2                 Bernie Curling Milligram, PA-C

## 2023-03-25 NOTE — PROGRESS NOTES
Problem: Falls - Risk of  Goal: *Absence of Falls  Description: Document Eula Highwood Fall Risk and appropriate interventions in the flowsheet.   Outcome: Progressing Towards Goal  Note: Fall Risk Interventions:

## 2023-03-26 PROCEDURE — 65270000029 HC RM PRIVATE

## 2023-03-26 PROCEDURE — 74011250637 HC RX REV CODE- 250/637: Performed by: PHYSICIAN ASSISTANT

## 2023-03-26 PROCEDURE — 74011000250 HC RX REV CODE- 250: Performed by: FAMILY MEDICINE

## 2023-03-26 RX ADMIN — ATORVASTATIN CALCIUM 20 MG: 20 TABLET, FILM COATED ORAL at 08:55

## 2023-03-26 RX ADMIN — MIRTAZAPINE 15 MG: 15 TABLET, FILM COATED ORAL at 22:00

## 2023-03-26 RX ADMIN — SODIUM CHLORIDE, PRESERVATIVE FREE 10 ML: 5 INJECTION INTRAVENOUS at 22:00

## 2023-03-26 RX ADMIN — ESCITALOPRAM 10 MG: 10 TABLET, FILM COATED ORAL at 08:56

## 2023-03-26 RX ADMIN — SODIUM CHLORIDE, PRESERVATIVE FREE 10 ML: 5 INJECTION INTRAVENOUS at 06:35

## 2023-03-26 RX ADMIN — SODIUM CHLORIDE, PRESERVATIVE FREE 10 ML: 5 INJECTION INTRAVENOUS at 19:01

## 2023-03-26 NOTE — PROGRESS NOTES
[facility] [facility] Adult  Hospitalist Group                                                                                          Hospitalist Progress Note  Yulissa Marquez NP        Date of Service:  3/26/2023  NAME:  Promise Martinez  :  1940  MRN:  [id number]    Admission Summary:     Mr. Kenea Pearce is a 81 y.o. male who presents from his long-term care facility after he fell while attempting to get out of bed. Patient lost his balance and fell forward, he struck both knees as well as left elbow. Denied any head injury or loss of consciousness. Patient presented to theED for further evaluation, imaging included CT of the head which showed no acute intracranial pathology, x-ray of both knees negative for acute fracture or dislocation. Lumbar spine x-ray shows T12, L2 and L3 compression fractures, age indeterminate. In the ER, while attempting to mobilize the patient, patient had significant pain and was unable to move. Interval history / Subjective:        Patient was seen on rounds, he was lying in bed resting in no acute distress. Cooperative and interactive with assessment. Denies any new complaints overnight reports his pain is controlled. Assessment & Plan:     S/p mechanical fall  - Work-up included CT of the head, bilateral knee which shows no acute pathology  - Lumbar spine x-ray shows T12, L2 and L3 compression fractures age-indeterminate  - Patient with significant pain in ED and unable to mobilize     Spinal compression fracture  - Age indeterminate compression fractures of the T12, L2 and L3  - MRI Spine: Acute versus subacute partial compression fractures of T12, L2, L3, L4.  - IR consulted for kyphoplasty which he had 3/24  - PRN pain management, controlled at present time  - Wheelchair bound and has assistance with ADLs at assisted living already.   Will discuss with case management tomorrow and see if PT/OT is a requirement prior to him returning to the facility  -Per nursing today, family had declined PT, their consult was canceled     Hypokalemia  - Replace potassium (low 3/23)  - recheck labs in am    HLD  - Continue Statin    Mood disorder  - Escitalopram 10mg daily  - Mirtazapine QHS    Code status: Full  Prophylaxis: SCDs  Care Plan discussed with: Patient, nursing, Attending MD  Anticipated Disposition: Back to [facility] following kyphoplasty. Hospital Problems  Never Reviewed            Codes Class Noted POA    Closed fracture dislocation of lumbar spine [facility]) ICD-10-CM: S32.009A  ICD-9-CM: 805.4  3/23/2023 Unknown         Review of Systems:     Denies headaches, dizziness  No chest pain or pressure  No shortness of breath or cough  No GI complaints such as nausea, vomiting, diarrhea or constipation reports a good appetite. Indicates pain is controlled    Vital Signs:    Last 24hrs VS reviewed since prior progress note. Most recent are:  Visit Vitals  /67 (BP 1 Location: Left upper arm, BP Patient Position: At rest)   Pulse 60   Temp 98.3 °F (36.8 °C)   Resp 18   Ht 5' 9\" (1.753 m)   Wt 59 kg (130 lb)   SpO2 93%   BMI 19.20 kg/m²     No intake or output data in the 24 hours ending 03/26/23 0703     Physical Examination:     I had a face to face encounter with this patient and independently examined them on 3/26/2023 as outlined below:        Constitutional: Elderly  male lying in bed in no acute distress, cooperative, pleasant    ENT:  Oral mucosa moist, oropharynx benign. Resp:  CTA bilaterally. No accessory muscle use. Sats 97% on room air   CV:  Regular rhythm, no murmurs. Heart rate 69    GI:  Soft, non distended, non tender. normoactive bowel sounds. Musculoskeletal: Moves all extremities. Wheelchair-bound no edema, warm.     Neurologic:  AAOx3, sensation grossly intact       Data Review:   Review and/or order of clinical lab test  Review and/or order of tests in the medicine section of CPT    Labs:     Recent Labs     03/23/23  0927   WBC 8.4 HGB 13.0   HCT 38.9          Recent Labs     03/23/23  0927      K 3.3*      CO2 34*   BUN 24*   CREA 0.73   GLU 95   CA 9.2       Recent Labs     03/23/23  0927   ALT 22   AP 98   TBILI 0.5   TP 7.0   ALB 3.4*   GLOB 3.6       Recent Labs     03/24/23  1119   INR 1.1   PTP 11.1        No results for input(s): FE, TIBC, PSAT, FERR in the last 72 hours. No results found for: FOL, RBCF   No results for input(s): PH, PCO2, PO2 in the last 72 hours. No results for input(s): CPK, CKNDX, TROIQ in the last 72 hours.     No lab exists for component: CPKMB  No results found for: CHOL, CHOLX, CHLST, CHOLV, HDL, HDLP, LDL, LDLC, DLDLP, TGLX, TRIGL, TRIGP, CHHD, CHHDX  No results found for: [facility]  Lab Results   Component Value Date/Time    Color YELLOW/STRAW 03/23/2023 09:27 AM    Appearance CLEAR 03/23/2023 09:27 AM    Specific gravity 1.022 03/23/2023 09:27 AM    pH (UA) 6.5 03/23/2023 09:27 AM    Protein Negative 03/23/2023 09:27 AM    Glucose Negative 03/23/2023 09:27 AM    Ketone Negative 03/23/2023 09:27 AM    Bilirubin Negative 03/23/2023 09:27 AM    Urobilinogen 1.0 03/23/2023 09:27 AM    Nitrites Negative 03/23/2023 09:27 AM    Leukocyte Esterase TRACE (A) 03/23/2023 09:27 AM    Epithelial cells FEW 03/23/2023 09:27 AM    Bacteria Negative 03/23/2023 09:27 AM    WBC 5-10 03/23/2023 09:27 AM    RBC >100 (H) 03/23/2023 09:27 AM     Medications Reviewed:     Current Facility-Administered Medications   Medication Dose Route Frequency    escitalopram oxalate (LEXAPRO) tablet 10 mg  10 mg Oral DAILY    atorvastatin (LIPITOR) tablet 20 mg  20 mg Oral DAILY    mirtazapine (REMERON) tablet 15 mg  15 mg Oral QHS    senna (SENOKOT) tablet 8.6 mg  1 Tablet Oral DAILY PRN    sodium chloride (NS) flush 5-40 mL  5-40 mL IntraVENous Q8H    sodium chloride (NS) flush 5-40 mL  5-40 mL IntraVENous PRN    acetaminophen (TYLENOL) tablet 650 mg  650 mg Oral Q6H PRN    Or    acetaminophen (TYLENOL) suppository 650 mg 650 mg Rectal Q6H PRN    polyethylene glycol (MIRALAX) packet 17 g  17 g Oral DAILY PRN    ondansetron (ZOFRAN ODT) tablet 4 mg  4 mg Oral Q8H PRN    Or    ondansetron (ZOFRAN) injection 4 mg  4 mg IntraVENous Q6H PRN    oxyCODONE IR (ROXICODONE) tablet 5 mg  5 mg Oral Q4H PRN   ______________________________________________________________________  EXPECTED LENGTH OF STAY: - - -  ACTUAL LENGTH OF STAY:          3              Ann Gallardo NP

## 2023-03-26 NOTE — PROGRESS NOTES
PT orders cancelled at this time as of 1330. Pt noted to be WC dependent, noted to be DC to [facility]. Please reconsult PT if evaluation indicated.      Celestine Arriaga, JOVITAT, PT

## 2023-03-26 NOTE — PROGRESS NOTES
Problem: Falls - Risk of  Goal: *Absence of Falls  Description: Document Vince Jessica Fall Risk and appropriate interventions in the flowsheet.   Outcome: Progressing Towards Goal  Note: Fall Risk Interventions:

## 2023-03-27 VITALS
TEMPERATURE: 98.3 F | OXYGEN SATURATION: 96 % | RESPIRATION RATE: 17 BRPM | SYSTOLIC BLOOD PRESSURE: 97 MMHG | WEIGHT: 130 LBS | BODY MASS INDEX: 19.26 KG/M2 | HEIGHT: 69 IN | HEART RATE: 64 BPM | DIASTOLIC BLOOD PRESSURE: 62 MMHG

## 2023-03-27 PROBLEM — S32.009A CLOSED FRACTURE DISLOCATION OF LUMBAR SPINE (HCC): Status: RESOLVED | Noted: 2023-03-23 | Resolved: 2023-03-27

## 2023-03-27 LAB
ANION GAP SERPL CALC-SCNC: 3 MMOL/L (ref 5–15)
ATRIAL RATE: 63 BPM
BUN SERPL-MCNC: 21 MG/DL (ref 6–20)
BUN/CREAT SERPL: 31 (ref 12–20)
CALCIUM SERPL-MCNC: 8.5 MG/DL (ref 8.5–10.1)
CALCULATED P AXIS, ECG09: 39 DEGREES
CALCULATED R AXIS, ECG10: 30 DEGREES
CALCULATED T AXIS, ECG11: 23 DEGREES
CHLORIDE SERPL-SCNC: 108 MMOL/L (ref 97–108)
CO2 SERPL-SCNC: 28 MMOL/L (ref 21–32)
CREAT SERPL-MCNC: 0.68 MG/DL (ref 0.7–1.3)
DIAGNOSIS, 93000: NORMAL
GLUCOSE SERPL-MCNC: 93 MG/DL (ref 65–100)
P-R INTERVAL, ECG05: 148 MS
POTASSIUM SERPL-SCNC: 3.6 MMOL/L (ref 3.5–5.1)
Q-T INTERVAL, ECG07: 392 MS
QRS DURATION, ECG06: 86 MS
QTC CALCULATION (BEZET), ECG08: 401 MS
SODIUM SERPL-SCNC: 139 MMOL/L (ref 136–145)
VENTRICULAR RATE, ECG03: 63 BPM

## 2023-03-27 PROCEDURE — 36415 COLL VENOUS BLD VENIPUNCTURE: CPT

## 2023-03-27 PROCEDURE — 74011000250 HC RX REV CODE- 250: Performed by: FAMILY MEDICINE

## 2023-03-27 PROCEDURE — 74011250637 HC RX REV CODE- 250/637: Performed by: PHYSICIAN ASSISTANT

## 2023-03-27 PROCEDURE — 80048 BASIC METABOLIC PNL TOTAL CA: CPT

## 2023-03-27 RX ORDER — ATORVASTATIN CALCIUM 20 MG/1
20 TABLET, FILM COATED ORAL DAILY
Qty: 30 TABLET | Refills: 0 | Status: SHIPPED
Start: 2023-03-28

## 2023-03-27 RX ORDER — MIRTAZAPINE 15 MG/1
15 TABLET, FILM COATED ORAL
Qty: 30 TABLET | Refills: 0 | Status: SHIPPED
Start: 2023-03-27

## 2023-03-27 RX ORDER — ACETAMINOPHEN 325 MG/1
650 TABLET ORAL
Qty: 30 TABLET | Refills: 0 | Status: SHIPPED
Start: 2023-03-27

## 2023-03-27 RX ORDER — SENNOSIDES 8.6 MG/1
1 TABLET ORAL
Qty: 15 TABLET | Refills: 0 | Status: SHIPPED
Start: 2023-03-27

## 2023-03-27 RX ORDER — ESCITALOPRAM OXALATE 10 MG/1
10 TABLET ORAL DAILY
Qty: 30 TABLET | Refills: 0 | Status: SHIPPED
Start: 2023-03-28

## 2023-03-27 RX ORDER — POLYETHYLENE GLYCOL 3350 17 G/17G
17 POWDER, FOR SOLUTION ORAL
Qty: 15 EACH | Refills: 0 | Status: SHIPPED
Start: 2023-03-27

## 2023-03-27 RX ADMIN — ATORVASTATIN CALCIUM 20 MG: 20 TABLET, FILM COATED ORAL at 09:04

## 2023-03-27 RX ADMIN — SODIUM CHLORIDE, PRESERVATIVE FREE 10 ML: 5 INJECTION INTRAVENOUS at 06:57

## 2023-03-27 RX ADMIN — ESCITALOPRAM 10 MG: 10 TABLET, FILM COATED ORAL at 09:04

## 2023-03-27 NOTE — DISCHARGE INSTRUCTIONS
Discharge Summary         PATIENT ID: Mayte Garcia  MRN: [id number]   YOB: 1940    DATE OF ADMISSION: 3/23/2023  8:07 AM    DATE OF DISCHARGE: 3/27/2023   PRIMARY CARE PROVIDER: [facility] provider  ATTENDING PHYSICIAN: Dmitriy Escalona MD  DISCHARGING PROVIDER: Ericka Das NP       CONSULTATIONS: IP CONSULT TO INTERVENTIONAL RADIOLOGY     PROCEDURES/SURGERIES: * No surgery found *     [address] [facility],[address] COURSE:   Mr. Lisa Nuñez is a 81 y.o. male who presents from his long-term care facility after he fell while attempting to get out of bed. Patient lost his balance and fell forward, he struck both knees as well as left elbow. Denied any head injury or loss of consciousness. Patient presented to theED for further evaluation, imaging included CT of the head which showed no acute intracranial pathology, x-ray of both knees negative for acute fracture or dislocation. Lumbar spine x-ray shows T12, L2 and L3 compression fractures, age indeterminate. In the ER, while attempting to mobilize the patient, patient had significant pain and was unable to move     DISCHARGE DIAGNOSES / PLAN:       S/p mechanical fall  - Work-up included CT of the head, bilateral knees which shows no acute pathology  - Lumbar spine x-ray showed T12, L2 and L3 compression fractures age-indeterminate  - Patient with significant pain in ED and unable to mobilize     Spinal compression fracture  - Age indeterminate compression fractures of the T12, L2 and L3  - MRI Spine: Acute versus subacute partial compression fractures of T12, L2, L3, L4.  - IR consulted for kyphoplasty which he had 3/24  - PRN pain management, controlled at present time. Use Tylenol as needed. - Wheelchair bound and has assistance with ADLs at assisted living.      Hypokalemia  - Replaced potassium (low 3/23)  - Resolved     HLD  - Continue Statin     Mood disorder  - Escitalopram 10mg daily  - Mirtazapine QHS     BMI: Body mass index is 19.2 kg/m².. This patient: Has a BMI within normal limits. PENDING TEST RESULTS:   At the time of discharge the following test results are still pending: none      ADDITIONAL CARE RECOMMENDATIONS:   Take medications as directed, tylenol for pain     DIET: Low fat, Low cholesterol     ACTIVITY: Activity as tolerated     EQUIPMENT needed: none new     NOTIFY YOUR PHYSICIAN FOR ANY OF THE FOLLOWING:   Fever over 101 degrees for 24 hours. Chest pain, shortness of breath, fever, chills, nausea, vomiting, diarrhea, change in mentation, falling, weakness, bleeding. Severe pain or pain not relieved by medications, as well as any other signs or symptoms that you may have questions about. FOLLOW UP APPOINTMENTS:    Follow-up Information         Follow up With Specialties Details Why Contact Jose FALCON   Follow up Follow up with [facility] Provider in 3-5 days               DISCHARGE MEDICATIONS:  Current Discharge Medication List              START taking these medications     Details   acetaminophen (TYLENOL) 325 mg tablet Take 2 Tablets by mouth every six (6) hours as needed for Pain. Qty: 30 Tablet, Refills: 0  Start date: 3/27/2023       escitalopram oxalate (LEXAPRO) 10 mg tablet Take 1 Tablet by mouth daily. Qty: 30 Tablet, Refills: 0  Start date: 3/28/2023       atorvastatin (LIPITOR) 20 mg tablet Take 1 Tablet by mouth daily. Qty: 30 Tablet, Refills: 0  Start date: 3/28/2023       mirtazapine (REMERON) 15 mg tablet Take 1 Tablet by mouth nightly. Qty: 30 Tablet, Refills: 0  Start date: 3/27/2023       senna (SENOKOT) 8.6 mg tablet Take 1 Tablet by mouth daily as needed for Constipation. Qty: 15 Tablet, Refills: 0  Start date: 3/27/2023       polyethylene glycol (MIRALAX) 17 gram packet Take 1 Packet by mouth daily as needed for Constipation.   Qty: 15 Each, Refills: 0  Start date: 3/27/2023              DISPOSITION:    Home With:    OT   PT   HH   RN        Long term SNF/Inpatient Rehab   xx Independent/assisted living     Hospice     Other:      PATIENT CONDITION AT DISCHARGE:   Functional status     Poor    xx Deconditioned      Independent       Cognition    xx Lucid      Forgetful      Dementia       Catheters/lines (plus indication)     Clark      PICC      PEG    xx None       Code status   xx  Full code      DNR       PHYSICAL EXAMINATION AT DISCHARGE:     Patient was seen and examined this morning, he was lying in bed in no acute distress. Cooperative and interactive with assessment. Denies any headaches, dizziness, chest pain, chest pressure, shortness of breath. Denies any GI complaints such as nausea, vomiting, diarrhea or constipation and has no dysuria. Patient lives at [facility], wheelchair-bound and has assistance with his ADLs. He reports that he does not have any pain at rest or with movement. Discussed with , patient to be discharged back to [facility] later on this afternoon. Constitutional: Elderly  male lying in bed in no acute distress, cooperative, pleasant    ENT:  Oral mucosa moist, oropharynx benign. Resp:  CTA bilaterally. No accessory muscle use. Sats 97% on room air   CV:  Regular rhythm, no murmurs. Heart rate 72    GI:  Soft, non distended, non tender. Normoactive bowel sounds. Musculoskeletal: Moves all extremities. No edema. Neurologic:  AAOx3, sensation grossly intact   Skin: Skin is warm.                              CHRONIC MEDICAL DIAGNOSES:  Problem List as of 3/27/2023 Date Reviewed: 3/27/2023              Codes Class Noted - Resolved     RESOLVED: Closed fracture dislocation of lumbar spine ([facility] ICD-10-CM: X56.138F  ICD-9-CM: 805.4   3/23/2023 - 3/27/2023            Greater than 30 minutes were spent with the patient on counseling and coordination of care     Signed:   Nba Chopra NP  3/27/2023  11:26 AM

## 2023-03-27 NOTE — PROGRESS NOTES
Problem: Falls - Risk of  Goal: *Absence of Falls  Description: Document Ulysses Sheldon Fall Risk and appropriate interventions in the flowsheet.   Outcome: Progressing Towards Goal  Note: Fall Risk Interventions:

## 2023-03-27 NOTE — DISCHARGE SUMMARY
Discharge Summary       PATIENT ID: Roslava Hunt  MRN: [id number]   YOB: 1940    DATE OF ADMISSION: 3/23/2023  8:07 AM    DATE OF DISCHARGE: 3/27/2023   PRIMARY CARE PROVIDER: Ruddy FALCON provider  ATTENDING PHYSICIAN: Kareem Alberto MD  DISCHARGING PROVIDER: Junior Holley NP      CONSULTATIONS: IP CONSULT TO INTERVENTIONAL RADIOLOGY    PROCEDURES/SURGERIES: * No surgery found *    [address] [facility],[address] COURSE:   Mr. Ifeoma Lee is a 81 y.o. male who presents from his long-term care facility after he fell while attempting to get out of bed. Patient lost his balance and fell forward, he struck both knees as well as left elbow. Denied any head injury or loss of consciousness. Patient presented to theED for further evaluation, imaging included CT of the head which showed no acute intracranial pathology, x-ray of both knees negative for acute fracture or dislocation. Lumbar spine x-ray shows T12, L2 and L3 compression fractures, age indeterminate. In the ER, while attempting to mobilize the patient, patient had significant pain and was unable to move    DISCHARGE DIAGNOSES / PLAN:      S/p mechanical fall  - Work-up included CT of the head, bilateral knees which shows no acute pathology  - Lumbar spine x-ray showed T12, L2 and L3 compression fractures age-indeterminate  - Patient with significant pain in ED and unable to mobilize     Spinal compression fracture  - Age indeterminate compression fractures of the T12, L2 and L3  - MRI Spine: Acute versus subacute partial compression fractures of T12, L2, L3, L4.  - IR consulted for kyphoplasty which he had 3/24  - PRN pain management, controlled at present time. Use Tylenol as needed. - Wheelchair bound and has assistance with ADLs at assisted living. Hypokalemia  - Replaced potassium (low 3/23)  - Resolved     HLD  - Continue Statin     Mood disorder  - Escitalopram 10mg daily  - Mirtazapine QHS     BMI: Body mass index is 19.2 kg/m². Maya Servin This patient: Has a BMI within normal limits. PENDING TEST RESULTS:   At the time of discharge the following test results are still pending: none     ADDITIONAL CARE RECOMMENDATIONS:   Take medications as directed, tylenol for pain    DIET: Low fat, Low cholesterol    ACTIVITY: Activity as tolerated    EQUIPMENT needed: none new    NOTIFY YOUR PHYSICIAN FOR ANY OF THE FOLLOWING:   Fever over 101 degrees for 24 hours. Chest pain, shortness of breath, fever, chills, nausea, vomiting, diarrhea, change in mentation, falling, weakness, bleeding. Severe pain or pain not relieved by medications, as well as any other signs or symptoms that you may have questions about. FOLLOW UP APPOINTMENTS:    Follow-up Information       Follow up With Specialties Details Why Contact Jose Byrnes [facility]  Follow up Follow up with [facility] Provider in 3-5 days            DISCHARGE MEDICATIONS:  Current Discharge Medication List        START taking these medications    Details   acetaminophen (TYLENOL) 325 mg tablet Take 2 Tablets by mouth every six (6) hours as needed for Pain. Qty: 30 Tablet, Refills: 0  Start date: 3/27/2023      escitalopram oxalate (LEXAPRO) 10 mg tablet Take 1 Tablet by mouth daily. Qty: 30 Tablet, Refills: 0  Start date: 3/28/2023      atorvastatin (LIPITOR) 20 mg tablet Take 1 Tablet by mouth daily. Qty: 30 Tablet, Refills: 0  Start date: 3/28/2023      mirtazapine (REMERON) 15 mg tablet Take 1 Tablet by mouth nightly. Qty: 30 Tablet, Refills: 0  Start date: 3/27/2023      senna (SENOKOT) 8.6 mg tablet Take 1 Tablet by mouth daily as needed for Constipation. Qty: 15 Tablet, Refills: 0  Start date: 3/27/2023      polyethylene glycol (MIRALAX) 17 gram packet Take 1 Packet by mouth daily as needed for Constipation.   Qty: 15 Each, Refills: 0  Start date: 3/27/2023           DISPOSITION:    Home With:   OT  PT  HH  RN       Long term SNF/Inpatient Rehab   xx Independent/assisted living    Hospice    Other: PATIENT CONDITION AT DISCHARGE:   Functional status    Poor    xx Deconditioned     Independent      Cognition    xx Lucid     Forgetful     Dementia      Catheters/lines (plus indication)    Clark     PICC     PEG    xx None      Code status   xx  Full code     DNR      PHYSICAL EXAMINATION AT DISCHARGE:    Patient was seen and examined this morning, he was lying in bed in no acute distress. Cooperative and interactive with assessment. Denies any headaches, dizziness, chest pain, chest pressure, shortness of breath. Denies any GI complaints such as nausea, vomiting, diarrhea or constipation and has no dysuria. Patient lives at [facility], wheelchair-bound and has assistance with his ADLs. He reports that he does not have any pain at rest or with movement. Discussed with , patient to be discharged back to [facility] later on this afternoon. Constitutional: Elderly  male lying in bed in no acute distress, cooperative, pleasant    ENT:  Oral mucosa moist, oropharynx benign. Resp:  CTA bilaterally. No accessory muscle use. Sats 97% on room air   CV:  Regular rhythm, no murmurs. Heart rate 72    GI:  Soft, non distended, non tender. Normoactive bowel sounds. Musculoskeletal: Moves all extremities. No edema. Neurologic:  AAOx3, sensation grossly intact   Skin: Skin is warm.                              CHRONIC MEDICAL DIAGNOSES:  Problem List as of 3/27/2023 Date Reviewed: 3/27/2023            Codes Class Noted - Resolved    RESOLVED: Closed fracture dislocation [facility] ([facility] ICD-10-CM: Q74.974Y  ICD-9-CM: 805.4  3/23/2023 - 3/27/2023         Greater than 30 minutes were spent with the patient on counseling and coordination of care    Signed:   Yuri Rizo, RENAY  3/27/2023  11:26 AM

## 2023-03-27 NOTE — PROGRESS NOTES
Transition of Care Plan  RUR- Low 12%  DISPOSITION: The disposition plan is EZEKIEL: [facility]   Report: [phone] ask for the wellness Center   F: [phone]  Per conversation with the rep in the wellness center; she reported that they are able to take the pt back today but he needs to arrive prior to 4pm.    F/U with PCP/Specialist    Transport: 2pm H2H W/C     Family contact is son Shannan Stratton reachable at [phone]   Update provided to the pt's son regarding discharge. Medicare pt has received, reviewed, and signed 2nd IM letter informing them of their right to appeal the discharge. Signed copy has been placed on pt bedside chart.     CM: [address] [place],   [phone]

## 2024-12-04 ENCOUNTER — APPOINTMENT (OUTPATIENT)
Facility: HOSPITAL | Age: 84
End: 2024-12-04
Payer: MEDICARE

## 2024-12-04 ENCOUNTER — HOSPITAL ENCOUNTER (INPATIENT)
Facility: HOSPITAL | Age: 84
LOS: 1 days | Discharge: HOME | End: 2024-12-06
Attending: STUDENT IN AN ORGANIZED HEALTH CARE EDUCATION/TRAINING PROGRAM | Admitting: STUDENT IN AN ORGANIZED HEALTH CARE EDUCATION/TRAINING PROGRAM
Payer: MEDICARE

## 2024-12-04 DIAGNOSIS — G45.9 TIA (TRANSIENT ISCHEMIC ATTACK): ICD-10-CM

## 2024-12-04 DIAGNOSIS — R41.0 CONFUSION: Primary | ICD-10-CM

## 2024-12-04 PROBLEM — R29.90 STROKE-LIKE SYMPTOMS: Status: ACTIVE | Noted: 2024-12-04

## 2024-12-04 LAB
ALBUMIN SERPL-MCNC: 3.6 G/DL (ref 3.5–5)
ALBUMIN/GLOB SERPL: 0.9 (ref 1.1–2.2)
ALP SERPL-CCNC: 91 U/L (ref 45–117)
ALT SERPL-CCNC: 16 U/L (ref 12–78)
ANION GAP SERPL CALC-SCNC: 6 MMOL/L (ref 2–12)
APPEARANCE UR: CLEAR
AST SERPL-CCNC: 18 U/L (ref 15–37)
BACTERIA URNS QL MICRO: NEGATIVE /HPF
BASOPHILS # BLD: 0 K/UL (ref 0–0.1)
BASOPHILS NFR BLD: 0 % (ref 0–1)
BILIRUB SERPL-MCNC: 0.4 MG/DL (ref 0.2–1)
BILIRUB UR QL: NEGATIVE
BUN SERPL-MCNC: 21 MG/DL (ref 6–20)
BUN/CREAT SERPL: 28 (ref 12–20)
CALCIUM SERPL-MCNC: 8.9 MG/DL (ref 8.5–10.1)
CHLORIDE SERPL-SCNC: 103 MMOL/L (ref 97–108)
CO2 SERPL-SCNC: 30 MMOL/L (ref 21–32)
COLOR UR: ABNORMAL
COMMENT:: NORMAL
CREAT SERPL-MCNC: 0.76 MG/DL (ref 0.7–1.3)
DIFFERENTIAL METHOD BLD: ABNORMAL
EOSINOPHIL # BLD: 0 K/UL (ref 0–0.4)
EOSINOPHIL NFR BLD: 0 % (ref 0–7)
EPITH CASTS URNS QL MICRO: ABNORMAL /LPF
ERYTHROCYTE [DISTWIDTH] IN BLOOD BY AUTOMATED COUNT: 13 % (ref 11.5–14.5)
GLOBULIN SER CALC-MCNC: 3.8 G/DL (ref 2–4)
GLUCOSE BLD STRIP.AUTO-MCNC: 126 MG/DL (ref 65–117)
GLUCOSE BLD STRIP.AUTO-MCNC: 99 MG/DL (ref 65–117)
GLUCOSE SERPL-MCNC: 113 MG/DL (ref 65–100)
GLUCOSE UR STRIP.AUTO-MCNC: NEGATIVE MG/DL
HCT VFR BLD AUTO: 41.5 % (ref 36.6–50.3)
HGB BLD-MCNC: 14 G/DL (ref 12.1–17)
HGB UR QL STRIP: ABNORMAL
HYALINE CASTS URNS QL MICRO: ABNORMAL /LPF (ref 0–5)
IMM GRANULOCYTES # BLD AUTO: 0 K/UL (ref 0–0.04)
IMM GRANULOCYTES NFR BLD AUTO: 0 % (ref 0–0.5)
KETONES UR QL STRIP.AUTO: ABNORMAL MG/DL
LEUKOCYTE ESTERASE UR QL STRIP.AUTO: NEGATIVE
LYMPHOCYTES # BLD: 0.3 K/UL (ref 0.8–3.5)
LYMPHOCYTES NFR BLD: 6 % (ref 12–49)
MAGNESIUM SERPL-MCNC: 2.3 MG/DL (ref 1.6–2.4)
MCH RBC QN AUTO: 30.8 PG (ref 26–34)
MCHC RBC AUTO-ENTMCNC: 33.7 G/DL (ref 30–36.5)
MCV RBC AUTO: 91.2 FL (ref 80–99)
MONOCYTES # BLD: 0.7 K/UL (ref 0–1)
MONOCYTES NFR BLD: 13 % (ref 5–13)
NEUTS SEG # BLD: 4.2 K/UL (ref 1.8–8)
NEUTS SEG NFR BLD: 81 % (ref 32–75)
NITRITE UR QL STRIP.AUTO: NEGATIVE
NRBC # BLD: 0 K/UL (ref 0–0.01)
NRBC BLD-RTO: 0 PER 100 WBC
PH UR STRIP: 5.5 (ref 5–8)
PLATELET # BLD AUTO: 257 K/UL (ref 150–400)
PMV BLD AUTO: 9.9 FL (ref 8.9–12.9)
POTASSIUM SERPL-SCNC: 3.6 MMOL/L (ref 3.5–5.1)
PROT SERPL-MCNC: 7.4 G/DL (ref 6.4–8.2)
PROT UR STRIP-MCNC: ABNORMAL MG/DL
RBC # BLD AUTO: 4.55 M/UL (ref 4.1–5.7)
RBC #/AREA URNS HPF: ABNORMAL /HPF (ref 0–5)
RBC MORPH BLD: ABNORMAL
SERVICE CMNT-IMP: ABNORMAL
SERVICE CMNT-IMP: NORMAL
SODIUM SERPL-SCNC: 139 MMOL/L (ref 136–145)
SP GR UR REFRACTOMETRY: 1.02 (ref 1–1.03)
SPECIMEN HOLD: NORMAL
TROPONIN I SERPL HS-MCNC: 6 NG/L (ref 0–76)
TSH SERPL DL<=0.05 MIU/L-ACNC: 1.26 UIU/ML (ref 0.36–3.74)
URINE CULTURE IF INDICATED: ABNORMAL
UROBILINOGEN UR QL STRIP.AUTO: 0.2 EU/DL (ref 0.2–1)
WBC # BLD AUTO: 5.2 K/UL (ref 4.1–11.1)
WBC URNS QL MICRO: ABNORMAL /HPF (ref 0–4)

## 2024-12-04 PROCEDURE — 81001 URINALYSIS AUTO W/SCOPE: CPT

## 2024-12-04 PROCEDURE — 85025 COMPLETE CBC W/AUTO DIFF WBC: CPT

## 2024-12-04 PROCEDURE — G0378 HOSPITAL OBSERVATION PER HR: HCPCS

## 2024-12-04 PROCEDURE — 6360000004 HC RX CONTRAST MEDICATION: Performed by: STUDENT IN AN ORGANIZED HEALTH CARE EDUCATION/TRAINING PROGRAM

## 2024-12-04 PROCEDURE — 82962 GLUCOSE BLOOD TEST: CPT

## 2024-12-04 PROCEDURE — 71045 X-RAY EXAM CHEST 1 VIEW: CPT

## 2024-12-04 PROCEDURE — 70450 CT HEAD/BRAIN W/O DYE: CPT

## 2024-12-04 PROCEDURE — 80053 COMPREHEN METABOLIC PANEL: CPT

## 2024-12-04 PROCEDURE — 84484 ASSAY OF TROPONIN QUANT: CPT

## 2024-12-04 PROCEDURE — 95717 EEG PHYS/QHP 2-12 HR W/O VID: CPT | Performed by: PSYCHIATRY & NEUROLOGY

## 2024-12-04 PROCEDURE — 84443 ASSAY THYROID STIM HORMONE: CPT

## 2024-12-04 PROCEDURE — 99285 EMERGENCY DEPT VISIT HI MDM: CPT

## 2024-12-04 PROCEDURE — 70498 CT ANGIOGRAPHY NECK: CPT

## 2024-12-04 PROCEDURE — 36415 COLL VENOUS BLD VENIPUNCTURE: CPT

## 2024-12-04 PROCEDURE — 83735 ASSAY OF MAGNESIUM: CPT

## 2024-12-04 RX ORDER — SODIUM CHLORIDE 0.9 % (FLUSH) 0.9 %
5-40 SYRINGE (ML) INJECTION EVERY 12 HOURS SCHEDULED
Status: DISCONTINUED | OUTPATIENT
Start: 2024-12-04 | End: 2024-12-06 | Stop reason: HOSPADM

## 2024-12-04 RX ORDER — POLYETHYLENE GLYCOL 3350 17 G/17G
17 POWDER, FOR SOLUTION ORAL DAILY PRN
Status: DISCONTINUED | OUTPATIENT
Start: 2024-12-04 | End: 2024-12-06 | Stop reason: HOSPADM

## 2024-12-04 RX ORDER — ASPIRIN 300 MG/1
300 SUPPOSITORY RECTAL DAILY
Status: DISCONTINUED | OUTPATIENT
Start: 2024-12-05 | End: 2024-12-06 | Stop reason: HOSPADM

## 2024-12-04 RX ORDER — ASPIRIN 81 MG/1
81 TABLET, CHEWABLE ORAL DAILY
Status: DISCONTINUED | OUTPATIENT
Start: 2024-12-05 | End: 2024-12-06 | Stop reason: HOSPADM

## 2024-12-04 RX ORDER — ONDANSETRON 2 MG/ML
4 INJECTION INTRAMUSCULAR; INTRAVENOUS EVERY 6 HOURS PRN
Status: DISCONTINUED | OUTPATIENT
Start: 2024-12-04 | End: 2024-12-06 | Stop reason: HOSPADM

## 2024-12-04 RX ORDER — ONDANSETRON 4 MG/1
4 TABLET, ORALLY DISINTEGRATING ORAL EVERY 8 HOURS PRN
Status: DISCONTINUED | OUTPATIENT
Start: 2024-12-04 | End: 2024-12-06 | Stop reason: HOSPADM

## 2024-12-04 RX ORDER — SODIUM CHLORIDE 9 MG/ML
INJECTION, SOLUTION INTRAVENOUS PRN
Status: DISCONTINUED | OUTPATIENT
Start: 2024-12-04 | End: 2024-12-06 | Stop reason: HOSPADM

## 2024-12-04 RX ORDER — SODIUM CHLORIDE 0.9 % (FLUSH) 0.9 %
5-40 SYRINGE (ML) INJECTION PRN
Status: DISCONTINUED | OUTPATIENT
Start: 2024-12-04 | End: 2024-12-06 | Stop reason: HOSPADM

## 2024-12-04 RX ORDER — IOPAMIDOL 755 MG/ML
100 INJECTION, SOLUTION INTRAVASCULAR
Status: COMPLETED | OUTPATIENT
Start: 2024-12-04 | End: 2024-12-04

## 2024-12-04 RX ADMIN — IOPAMIDOL 100 ML: 755 INJECTION, SOLUTION INTRAVENOUS at 23:15

## 2024-12-04 ASSESSMENT — ENCOUNTER SYMPTOMS: SHORTNESS OF BREATH: 0

## 2024-12-04 ASSESSMENT — PAIN SCALES - GENERAL: PAINLEVEL_OUTOF10: 0

## 2024-12-04 NOTE — ED PROVIDER NOTES
[facility] EMERGENCY DEP  EMERGENCY DEPARTMENT ENCOUNTER      Pt Name: Terence Chavez  MRN: [id number]  Birthdate 1940  Date of evaluation: 12/4/2024  Provider: Ivan Avila DO    CHIEF COMPLAINT       Chief Complaint   Patient presents with    Altered Mental Status         HISTORY OF PRESENT ILLNESS   (Location/Symptom, Timing/Onset, Context/Setting, Quality, Duration, Modifying Factors, Severity)  Note limiting factors.   This is a 84-year-old male brought in for evaluation of confusion blurry vision suppose it pinpoint pupils.  Patient son believes that his symptoms started around 1600 on 12/3/2024.  Although this has been intermittent.  Patient reports that his vision is fine.  He is also has some confusion which is new.  He denies any chest pain or shortness of breath.            Review of External Medical Records:     Nursing Notes were reviewed.    REVIEW OF SYSTEMS    (2-9 systems for level 4, 10 or more for level 5)     Review of Systems   Constitutional:  Negative for fever.   Eyes:  Positive for visual disturbance.   Respiratory:  Negative for shortness of breath.    Cardiovascular:  Negative for chest pain.   Neurological:  Negative for weakness and numbness.   Psychiatric/Behavioral:  Positive for confusion.        Except as noted above the remainder of the review of systems was reviewed and negative.       PAST MEDICAL HISTORY   No past medical history on file.      SURGICAL HISTORY       Past Surgical History:   Procedure Laterality Date    IR KYPHOPLASTY LUMBAR FIRST LEVEL  3/24/2023    IR KYPHOPLASTY LUMBAR FIRST LEVEL 3/24/2023 [facility] RAD ANGIO IR    IR KYPHOPLASTY LUMBAR FIRST LEVEL  3/24/2023         CURRENT MEDICATIONS       Previous Medications    No medications on file       ALLERGIES     Patient has no known allergies.    FAMILY HISTORY     No family history on file.       SOCIAL HISTORY       Social History     Socioeconomic History    Marital status:            PHYSICAL EXAM

## 2024-12-04 NOTE — ED TRIAGE NOTES
Patient arrives via EMS, chief complaint of confusion, blurry vision, and pinpoint pupils- unknown Last known well time, although son believes to be yesterday around 4pm.

## 2024-12-05 ENCOUNTER — APPOINTMENT (OUTPATIENT)
Facility: HOSPITAL | Age: 84
End: 2024-12-05
Payer: MEDICARE

## 2024-12-05 PROBLEM — G31.9 NEURODEGENERATIVE DISORDER (HCC): Status: ACTIVE | Noted: 2024-12-05

## 2024-12-05 PROBLEM — G93.40 ACUTE ENCEPHALOPATHY: Status: ACTIVE | Noted: 2024-12-05

## 2024-12-05 PROBLEM — R41.0 CONFUSION: Status: ACTIVE | Noted: 2024-12-05

## 2024-12-05 PROBLEM — R56.9 SEIZURE (HCC): Status: ACTIVE | Noted: 2024-12-05

## 2024-12-05 LAB
AMPHET UR QL SCN: NEGATIVE
BARBITURATES UR QL SCN: NEGATIVE
BENZODIAZ UR QL: NEGATIVE
CANNABINOIDS UR QL SCN: NEGATIVE
CHOLEST SERPL-MCNC: 132 MG/DL
COCAINE UR QL SCN: NEGATIVE
COMMENT:: NORMAL
EST. AVERAGE GLUCOSE BLD GHB EST-MCNC: 103 MG/DL
ETHANOL SERPL-MCNC: 11 MG/DL (ref 0–0.08)
HBA1C MFR BLD: 5.2 % (ref 4–5.6)
HDLC SERPL-MCNC: 53 MG/DL
HDLC SERPL: 2.5 (ref 0–5)
LDLC SERPL CALC-MCNC: 67.4 MG/DL (ref 0–100)
Lab: NORMAL
METHADONE UR QL: NEGATIVE
OPIATES UR QL: NEGATIVE
PCP UR QL: NEGATIVE
SPECIMEN HOLD: NORMAL
TRIGL SERPL-MCNC: 58 MG/DL
VLDLC SERPL CALC-MCNC: 11.6 MG/DL

## 2024-12-05 PROCEDURE — G0378 HOSPITAL OBSERVATION PER HR: HCPCS

## 2024-12-05 PROCEDURE — 6370000000 HC RX 637 (ALT 250 FOR IP): Performed by: NURSE PRACTITIONER

## 2024-12-05 PROCEDURE — 80061 LIPID PANEL: CPT

## 2024-12-05 PROCEDURE — 70551 MRI BRAIN STEM W/O DYE: CPT

## 2024-12-05 PROCEDURE — 80307 DRUG TEST PRSMV CHEM ANLYZR: CPT

## 2024-12-05 PROCEDURE — 2580000003 HC RX 258: Performed by: STUDENT IN AN ORGANIZED HEALTH CARE EDUCATION/TRAINING PROGRAM

## 2024-12-05 PROCEDURE — 82077 ASSAY SPEC XCP UR&BREATH IA: CPT

## 2024-12-05 PROCEDURE — 97161 PT EVAL LOW COMPLEX 20 MIN: CPT

## 2024-12-05 PROCEDURE — 95816 EEG AWAKE AND DROWSY: CPT

## 2024-12-05 PROCEDURE — 95816 EEG AWAKE AND DROWSY: CPT | Performed by: PSYCHIATRY & NEUROLOGY

## 2024-12-05 PROCEDURE — 2060000000 HC ICU INTERMEDIATE R&B

## 2024-12-05 PROCEDURE — 83036 HEMOGLOBIN GLYCOSYLATED A1C: CPT

## 2024-12-05 PROCEDURE — 99223 1ST HOSP IP/OBS HIGH 75: CPT | Performed by: PSYCHIATRY & NEUROLOGY

## 2024-12-05 PROCEDURE — 95706 EEG WO VID 2-12HR INTMT MNTR: CPT

## 2024-12-05 PROCEDURE — 6370000000 HC RX 637 (ALT 250 FOR IP): Performed by: STUDENT IN AN ORGANIZED HEALTH CARE EDUCATION/TRAINING PROGRAM

## 2024-12-05 PROCEDURE — 97530 THERAPEUTIC ACTIVITIES: CPT

## 2024-12-05 PROCEDURE — 36415 COLL VENOUS BLD VENIPUNCTURE: CPT

## 2024-12-05 RX ORDER — CLOPIDOGREL BISULFATE 75 MG/1
75 TABLET ORAL DAILY
Status: DISCONTINUED | OUTPATIENT
Start: 2024-12-05 | End: 2024-12-05

## 2024-12-05 RX ORDER — ATORVASTATIN CALCIUM 20 MG/1
10 TABLET, FILM COATED ORAL NIGHTLY
Status: DISCONTINUED | OUTPATIENT
Start: 2024-12-05 | End: 2024-12-06 | Stop reason: HOSPADM

## 2024-12-05 RX ORDER — ESCITALOPRAM OXALATE 10 MG/1
5 TABLET ORAL DAILY
Status: DISCONTINUED | OUTPATIENT
Start: 2024-12-05 | End: 2024-12-06 | Stop reason: HOSPADM

## 2024-12-05 RX ADMIN — CLOPIDOGREL BISULFATE 75 MG: 75 TABLET ORAL at 13:29

## 2024-12-05 RX ADMIN — ATORVASTATIN CALCIUM 10 MG: 20 TABLET, FILM COATED ORAL at 22:53

## 2024-12-05 RX ADMIN — ASPIRIN 81 MG CHEWABLE TABLET 81 MG: 81 TABLET CHEWABLE at 09:13

## 2024-12-05 RX ADMIN — Medication 10 ML: at 09:14

## 2024-12-05 RX ADMIN — Medication 10 ML: at 22:54

## 2024-12-05 NOTE — CARE COORDINATION
Transition of Care Plan:    Return to [facility]   [address], [place] [address]     Transport: BLS     RUR:  n/a obs  Prior Level of Functioning:  Complete care, transfers to  with [facility], incontinent care in bed (son reports OT has been trying to transition pt back to toileting with assist). Currently sponge baths.   Disposition:  [facility]  BRIA:  12/6  Follow up appointments: PCP  DME needed: no - has all needed DME   Transportation at discharge: BLS likely  IM/IMM Medicare/ letter given: no, obs  Caregiver Contact:   Brian Chavez (Child)  [phone] (Home Phone)   Discharge Caregiver contacted prior to discharge? yes  Care Conference needed? no  Barriers to discharge: medical     215pm: Pt will return to [facility] when medically stable. She is complete care at baseline and is open with PT OT. CM left VM for DON at facility.     SUSHMA Marcum

## 2024-12-05 NOTE — CARE COORDINATION
Unable to complete assessment, pt asleep and sons away from bedside. 6S bed assigned. Pt resides at [facility]. PT completed eval and reported sons stated LISA will take pt back at any level of care. Unit CM will confirm with LISA the parameters for pt dc back to senior living vs SNF rehab.     PT rec -  Moderate intensity short-term skilled physical therapy up to 5x/week vs return senior living with increased assist.     NATALI Olguin    [facility]    or by [employer]

## 2024-12-05 NOTE — ED NOTES
This RN discovered patient increasingly altered. Family reported that patient began \"convulsing\" and gagging while they were speaking to the patient. When this RN assessed patient he was at baseline. This RN notified MD Avila and orders received for ceribell. Care ongoing.

## 2024-12-05 NOTE — H&P
History & Physical    Primary Care Provider: William Steiner APRN - NP  Source of Information: Patient and chart review    History of Presenting Illness:   Terence Chavez is a 84 y.o. male with hx of lumbar compression fracture, hx of infarct right basal ganglia, advanced cerebral atrophy, dyslipidemia, mood disorder who presnets to ed accompanied by his family with multiple concerns.  Endorses chronic history of some deteriorating vision describing blurry vision but over the last 2 days this is acutely worsened.  He has reported to his sons that he has had increased difficulty with his vision.  Son at bedside also reports patient called him today and reported some confusion.  Patient's son states he also noted some malaise and fatigue and some convulsive like movements yesterday.  He had recurrence of same convulsion type movement while in ED and was mildly confused.  No reported falls or head injury.  Denies any alcohol or illicit drug use.  No previous history of any seizures.    The patient denies any fever, chills, chest or abdominal pain, nausea, vomiting, cough, congestion, recent illness, palpitations, or dysuria.  Remarkable vitals on ER Presentation: vss  Labs Remarkable for: ua: trace blood and ketones  ER Images: ct head and cxr: no acute process.  ER Rx: none     Review of Systems:  Pertinent items are noted in the History of Present Illness.     No past medical history on file.   Past Surgical History:   Procedure Laterality Date    IR KYPHOPLASTY LUMBAR FIRST LEVEL  3/24/2023    IR KYPHOPLASTY LUMBAR FIRST LEVEL 3/24/2023 [facility] RAD ANGIO IR    IR KYPHOPLASTY LUMBAR FIRST LEVEL  3/24/2023     Prior to Admission medications    Not on File     No Known Allergies   No family history on file.     SOCIAL HISTORY:  Patient resides:  Independently x   Assisted Living    SNF    With family care       Smoking history:   None x   Former    Chronic      Alcohol history:   None x   Social    Chronic   PM   Result Value Ref Range    POC Glucose 99 65 - 117 mg/dL    Performed by: Lobo Steel RN    Urinalysis with Reflex to Culture    Collection Time: 12/04/24  6:10 PM    Specimen: Urine   Result Value Ref Range    Color, UA YELLOW/STRAW      Appearance CLEAR CLEAR      Specific Gravity, UA 1.025 1.003 - 1.030      pH, Urine 5.5 5.0 - 8.0      Protein, UA TRACE (A) NEG mg/dL    Glucose, Ur Negative NEG mg/dL    Ketones, Urine TRACE (A) NEG mg/dL    Bilirubin, Urine Negative NEG      Blood, Urine TRACE (A) NEG      Urobilinogen, Urine 0.2 0.2 - 1.0 EU/dL    Nitrite, Urine Negative NEG      Leukocyte Esterase, Urine Negative NEG      WBC, UA 0-4 0 - 4 /hpf    RBC, UA 5-10 0 - 5 /hpf    Epithelial Cells, UA FEW FEW /lpf    BACTERIA, URINE Negative NEG /hpf    Urine Culture if Indicated CULTURE NOT INDICATED BY UA RESULT CNI      Hyaline Casts, UA 0-2 0 - 5 /lpf   POCT Glucose    Collection Time: 12/04/24  8:49 PM   Result Value Ref Range    POC Glucose 126 (H) 65 - 117 mg/dL    Performed by: JORGE Montez          Imaging:     Assessment:     Terence Chavez is a 84 y.o. male with hx of lumbar compression fracture, hx of infarct right basal ganglia, advanced cerebral atrophy, dyslipidemia, mood disorder who is admitetd for cva r/o.       Plan:       Stroke-Like Symptoms / TIA / Transient Encephalopathy  ?Seizures w/ Post-Ictal state  -Chart review shows history of transient acute encephalopathy due to CVA on 03/2023  -CT head today shows no acute process  -No seizure burden on rapid eeg  -Will check EtOH, UDS, MRI brain, CTA Head and neck pending  -Fall and seizure precautions  -neuro consult in am    Dyslipidemia  -previously on statin. Lipids in am    Hypertension  -previously on losartan    Mood disorder  -Continue PTA Lexapro          FEN/GI -  ns@0ml/hr  Activity - as tolerated  DVT prophylaxis - scds  GI prophylaxis -  none indicated  Disposition - home    CODE STATUS:   full code       Signed By: Brian Mulligan MD

## 2024-12-05 NOTE — ED NOTES
Headband: applied  Date/Time: 2115 12/4/24  Recorder: recording started  Skin: intact  Highest Seizure Mission Percentage past hour: 0        General info regarding Seizure Mission %:  Minimum duration of study is 2 hours. If Seizure Mission has remained 0% throughout the entire 2-hour duration, communicate with provider to stop the recording.     Seizure Mission 0-10% - Continue to monitor and complete 2-hour study.  Seizure Mission 11-89% - Epileptiform activity present. Notify provider for next steps.  Seizure Mission >/= 90% - Epileptiform activity consistent with Status Epilepticus. Immediately notify provider.     *Patients with Seizure Mission above 10% that persists may require a study longer than 2 hours. Maximum recording duration is 24 hours. Please update provider with a persistent increase in Seizure Mission above 10%.

## 2024-12-05 NOTE — PROCEDURES
PROCEDURE: ROUTINE INPATIENT EEG  NAME:   Terence Chavez  ACCOUNT NUMBER : [id number]  MRN:   [id number]  DATE OF SERVICE: 12/5/2024     HISTORY/INDICATION: Patient is an 84-year-old male with neurodegenerative disorder of unknown etiology presenting with confusion with witnessed spell in the emergency department concerning for seizure.  EEG is performed to assess for evidence of underlying epilepsy.    MEDICATIONS:   Current Facility-Administered Medications   Medication Dose Route Frequency Provider Last Rate Last Admin    escitalopram (LEXAPRO) tablet 5 mg  5 mg Oral Daily Brian Mulligan MD        atorvastatin (LIPITOR) tablet 10 mg  10 mg Oral Nightly Minor, Kamille, APRN - NP        sodium chloride flush 0.9 % injection 5-40 mL  5-40 mL IntraVENous 2 times per day Brian Mulligan MD   10 mL at 12/05/24 0914    sodium chloride flush 0.9 % injection 5-40 mL  5-40 mL IntraVENous PRN Brian Mulligan MD        0.9 % sodium chloride infusion   IntraVENous PRN Brian Mulligan MD        ondansetron (ZOFRAN-ODT) disintegrating tablet 4 mg  4 mg Oral Q8H PRN Brian Mulligan MD        Or    ondansetron (ZOFRAN) injection 4 mg  4 mg IntraVENous Q6H PRN Brian Mulligan MD        polyethylene glycol (GLYCOLAX) packet 17 g  17 g Oral Daily PRN Brian Mulligan MD        aspirin chewable tablet 81 mg  81 mg Oral Daily Brian Mulligan MD   81 mg at 12/05/24 0913    Or    aspirin suppository 300 mg  300 mg Rectal Daily Brian Mulligan MD           CONDITIONS OF RECORDING: This is a routine 21-channel EEG recording performed in accordance with the international 10-20 system with one channel devoted to limited EKG. This study was done during a state of wakefulness. Photic stimulation was performed as an activating procedure.       DESCRIPTION:   Upon maximal arousal the posterior dominant rhythm has a frequency of 8Hz with an amplitude of 20uV. This activity is symmetric over the bilateral posterior

## 2024-12-05 NOTE — PLAN OF CARE
risk.           PLAN :  Recommendations and Planned Interventions:   bed mobility training, transfer training, therapeutic exercises, neuromuscular re-education, patient and family training/education, and therapeutic activities    Frequency/Duration: Patient will be followed by physical therapy to address goals, PT Plan of Care: 5 times/week to address goals.    Recommend for next PT session: tolerating sitting on bed     Recommendation for discharge: (in order for the patient to meet his/her long term goals):   Moderate intensity short-term skilled physical therapy up to 5x/week  If pt were to return to [facility], would benefit from HHPT and require assistance/supervision for all levels of mobility as pt is a fall risk      Other factors to consider for discharge: poor safety awareness, impaired cognition, high risk for falls, and not safe to be alone    IF patient discharges home will need the following DME: patient owns DME required for discharge                SUBJECTIVE:   Patient stated “I am okay.”    OBJECTIVE DATA SUMMARY:       No past medical history on file.  Past Surgical History:   Procedure Laterality Date    IR KYPHOPLASTY LUMBAR FIRST LEVEL  3/24/2023    IR KYPHOPLASTY LUMBAR FIRST LEVEL 3/24/2023 H RAD ANGIO IR    IR KYPHOPLASTY LUMBAR FIRST LEVEL  3/24/2023       Home Situation:  Social/Functional History  Lives With: Other (comment) ([facility])  Type of Home: Assisted living  Home Layout: One level  Home Access: Level entry  Bathroom Shower/Tub: Walk-in shower  Bathroom Toilet: Handicap height  Bathroom Equipment: Grab bars in shower, Tub transfer bench, Hand-held shower, Grab bars around toilet  Bathroom Accessibility: Accessible, Wheelchair accessible, Walker accessible  Home Equipment: Adjustable bed, Grab bars, Hospital bed, Sliding Board, Wheelchair - Manual  Receives Help From: Other (comment) ([facility] staff)  Prior Level of Assist for ADLs: Needs assistance  Bath: Dependent/Total  Dressing: Maximum  Unsupported: Unable to stand 30 seconds unsupported  3. Sitting with Back Unsupported but Feet Supported on Floor or on a Stool: Unable to sit without support 10 seconds  4. Standing to Sitting: Needs assist to sit  5.  Transfers: Needs two people to assist or supervise to be safe  6. Standing Unsupported with Eyes Closed: Needs help to keep from falling  7. Standing Unsupported with Feet Together: Needs help to attain position and unable to hold for 15 seconds  8. Reach Forward with Outstretched Arm While Standing: Loses balance while trying/requires external support  9.  Object from Floor from a Standing Position: Unable to try/needs assist to keep from losing balance or falling  10. Turning to Look Behind Over Left and Right Shoulders While Standing: Needs assist to keep from losing balance or falling  11. Turn 360 Degrees: Needs assistance while turning  12. Place Alternate Foot on Step or Stool While Standing Unsupported: Needs assistance to keep from falling/unable to try  13. Standing Unsupported One Foot in Front: Loses balance while stepping or standing  14. Standing on One Leg: Unable to try needs assist to prevent fall  Webb Balance Score: 0         56=Maximum possible score;   0-20=High fall risk  21-40=Moderate fall risk   41-56=Low fall risk                                                                                                                                                                                                                                   Activity Tolerance:   Fair  and requires rest breaks    After treatment:   Patient left in no apparent distress in bed, Call bell within reach, and Caregiver / family present    COMMUNICATION/EDUCATION:   The patient's plan of care was discussed with: occupational therapist, registered nurse, and     Patient Education  Education Given To: Patient;Family  Education Provided: Role of Therapy;Mobility Training;Fall Prevention  Strategies;Transfer Training;Equipment  Education Method: Demonstration;Verbal  Barriers to Learning: Vision  Education Outcome: Verbalized understanding;Continued education needed    Thank you for this referral.  Shanae Galvez, PT  Minutes: 19      Physical Therapy Evaluation Charge Determination   History Examination Presentation Decision-Making   HIGH Complexity :3+ comorbidities / personal factors will impact the outcome/ POC  LOW Complexity : 1-2 Standardized tests and measures addressing body structure, function, activity limitation and / or participation in recreation  MEDIUM Complexity : Evolving with changing characteristics  Webb Balance Test  HIGH    Based on the above components, the patient evaluation is determined to be of the following complexity level: Low

## 2024-12-05 NOTE — PROGRESS NOTES
Orders received, chart reviewed and patient evaluated by physical therapy. Pending progression with skilled acute physical therapy, recommend:    Moderate intensity short-term skilled physical therapy up to 5x/week vs return correction with increased assist    Full evaluation to follow.

## 2024-12-05 NOTE — CONSULTS
Neurology Consult Note     NAME: Terence Chavez   :  1940   MRN:  [id number]   DATE:  2024       HPI:  Pt is an 83yo RH male admitted 25 with c/o confusion, blurry vision x 2 days. /68. Family reported that pt began \"convulsing\" and gagging while they were speaking to the pt, RN in ED assessed pt and he was at baseline. Ceribell done. CTH neg. CTA H/N with high grade stenosis of R>L PCAs, due to atherosclerotic plaques, no LVO. MRI brain neg for acute changes, chronic right BG infarct. EKG pending.  LDL 67.4, HgbA1C 5.2. UA neg, Etoh 11, UDS neg.   Pt is seen with his 2 sons at bedside who aid in the history.  The patient states he came in because he had confusion, states \"I have a real problem with confusion.\"  He gives example of turning the TV on and then not certain what channel it was on.  Notes trouble with his short-term memory.  His son adds that he got a call from the patient yesterday morning stating he was feeling \"flaky\", he was having spinning dizziness, and generalized weakness.  They noted he had a cough which is not typical.  The patient reported his symptoms actually started the night before.  He pressed the call button at his assisted living facility to have someone come and help him, but no one ever came.  While in the emergency department they were feeding him he was sitting upright he suddenly stopped his bilateral arms and upper body shaking and he had a vocalization lasted a short time and afterwards he was very pale and confused and slowly came back around when asked about vision he reported he can only see the bottom half of people not the top half but that gradually went away.  Patient has no memory of this event.  Currently he feels fatigued and still has blurry vision.  Patient has a longstanding history of imbalance since  3).  Intracranial vertebral arteries, PICAs, basilar artery and its branches.  There is no evidence of large vessel occlusion in the anterior circulation.  No obvious intracranial aneurysm or high flow vascular malformation.  The dural  venous sinuses and deep cerebral venous system are patent.    Neck:  NASCET method was utilized for calculating stenosis.  Four-vessel branch aortic arch with the left vertebral artery arising directly  from the aortic arch, developmental variant. Bilateral common carotid arteries  demonstrate no significant stenosis. There is no evidence of significant  stenosis in the cervical internal carotid arteries. Left carotid bulb calcified  plaques without significant luminal narrowing.  There is 0% stenosis of the right carotid artery.  There is 0% stenosis of the left carotid artery.    There is a right vertebral artery dominant vertebrobasilar arterial system. The  cervical vertebral arteries are normal in course, size and contour without  significant stenosis, noting due to dental amalgam associated metallic artifact  which precludes optimal evaluation of the distal cervical vertebral arteries.    OTHERS:  No evidence of intracranial mass or abnormal enhancement on delayed phase  images.  Status post bilateral lens replacement. The orbits are otherwise unremarkable.  Near complete opacification of anterior left ethmoid air cells with associated  trace fluid level within the right maxillary sinus, suggestive of sinusitis. The  mastoids are free of fluid bilaterally. Dental amalgam associated metallic  artifact precludes optimal evaluation of the adjacent structures, oral cavity  and oropharynx. Visualized soft tissues of the skull base and neck are  unremarkable. Visualized lung apices are clear. Probable extrapleural nodular  calcification measuring up to 1.1 cm along the anterolateral right upper lobe  (image 27 of series 4), likely calcified granuloma. No acute fracture or  aggressive  osseous lesion. Reversal of normal cervical lordosis with associated  4 mm anterolisthesis of C3 on C4, and multilevel degenerative changes of the  cervical spine.    Impression  1. CTA head demonstrates patchy high-grade stenosis of the right greater than  left PCAs, likely due to noncalcified atherosclerotic plaques; vasospasm not  completely excluded. Clinical correlation advised.  2. CTA neck demonstrates no large vessel occlusion, high-grade stenosis or  aneurysm.    Others:  3. No obvious intracranial or cervical mass or enhancing lesion.  Additional findings suggest acute paranasal sinusitis.        Electronically signed by THAIS RIVAS          Assessment and Plan:   Pt is an 85yo RH male admitted 12/4/24 with c/o confusion, blurry vision, spinning dizziness, generalized weakness, and has an acute cough, beginning 12/3/24. /68. While in the ED, pt had motor arrest, vocalization, shaking of upper body, afterwards he was pale and confused, no memory of event. Ceribell done after the event and no seizure detected. CTH neg. CTA H/N with high grade stenosis of R>L PCAs, due to atherosclerotic plaques, no LVO. MRI brain neg for acute changes, chronic right BG infarct. EKG pending.  LDL 67.4, HgbA1C 5.2. UA neg, Etoh 11, UDS neg. Pt has more longstanding h/o imbalance with falls beginning in his 50's, resulting in multiple fracture including skull fracture with LOC.   Exam - Alert, oriented, appropriate, opens eyes minimally, ophthalmoplegia, bilateral LE spasticity with weakness and up going toes bilaterally.     Possible mild encephalopathy due to illness, but spell in the ED is concerning for seizure, so unwitnessed seizures at home with post-ictal confusion is a possibility.  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   -EEG ordered  -Discussed having patient reassessed at [place], new findings on

## 2024-12-05 NOTE — ED NOTES
Headband: removed  Date/Time: 12/4/24///2315  Recorder: recording stopped  Skin: intact  Highest Seizure [place] Percentage past hour: 0        General info regarding Seizure [place] %:  Minimum duration of study is 2 hours. If Seizure [place] has remained 0% throughout the entire 2-hour duration, communicate with provider to stop the recording.     Seizure [place] 0-10% - Continue to monitor and complete 2-hour study.  Seizure [place] 11-89% - Epileptiform activity present. Notify provider for next steps.  Seizure [place] >/= 90% - Epileptiform activity consistent with Status Epilepticus. Immediately notify provider.     *Patients with Seizure Montrose above 10% that persists may require a study longer than 2 hours. Maximum recording duration is 24 hours. Please update provider with a persistent increase in Seizure Montrose above 10%.

## 2024-12-05 NOTE — PROGRESS NOTES
Occupational Therapy  Order received and chart reviewed, attempted to see however patient prepping for EEG in room. Will continue to follow.   Katherine Walker, OTR/L

## 2024-12-05 NOTE — PROGRESS NOTES
Speech pathology  Orders received, chart reviewed. Patient in with blurry vision and confusion. No reported changes with speech. Brain Mri completed and shows \"No acute infarction. Generalized cerebral/cerebellar parenchymal volume loss,  mild chronic microvascular ischemic disease, and small chronic right basal ganglia infarction.\"  Patient had no SLP needs during 2020 admission for cva. Choco has passed the [place] swallow screen and is on a regular diet/thin liquids. Formal speech/swallow eval not indicated. Will sign off.     Marce Hoffman M.S. DARIN-SLP

## 2024-12-05 NOTE — CARE COORDINATION
Care Management Initial Assessment       RUR: not available at this time   Readmission? No  1st IM letter given? No n/a   1st  letter given: No       12/05/24 1040   Service Assessment   Patient Orientation Alert and Oriented;Person;Self;Situation;Place   Cognition Alert   History Provided By Child/Family   Primary Caregiver Other (Comment)  (group home)   Accompanied By/Relationship Sons - two of four sons at bedside   Support Systems Children;Family Members   Patient's Healthcare Decision Maker is: Legal Next of Kin   PCP Verified by CM Yes   Last Visit to PCP Within last 3 months   Prior Functional Level Assistance with the following:;Bathing;Dressing;Toileting;Cooking;Housework;Shopping;Mobility   Current Functional Level Assistance with the following:;Bathing;Dressing;Toileting;Cooking;Housework;Shopping;Mobility   Can patient return to prior living arrangement Yes  (Pt was complete care at baseline with hospital bed and all other needed DME available)   Ability to make needs known: Fair   Family able to assist with home care needs: No   Would you like for me to discuss the discharge plan with any other family members/significant others, and if so, who? Yes  (Sons -  Brian Verity)   Financial Resources Medicare   Social/Functional History   Lives With Other (comment)  ([facility])   Type of Home Assisted living   Home Layout One level   Home Access Level entry   Bathroom Shower/Tub Walk-in shower   Bathroom Toilet Handicap height   Bathroom Equipment Grab bars in shower;Tub transfer bench;Hand-held shower;Grab bars around toilet   Bathroom Accessibility Accessible;Wheelchair accessible;Walker accessible   Home Equipment Adjustable bed;Grab bars;Hospital bed;Sliding Board;Wheelchair - Manual   Prior Level of Assist for ADLs Needs assistance   Bath Dependent/Total   Dressing Maximum assistance   Grooming Maximum assistance   Feeding Modified independent    Toileting Needs assistance   Ambulation Assistance  Non-ambulatory   Prior Level of Assist for Transfers Needs assistance  (max assist)   Active  No   Discharge Planning   Living Arrangements Other (Comment)  ([facility])   Type of Home Care Services OT;PT  (Pt open with PT OT at [facility]/[place])   Patient expects to be discharged to: Assisted living   Services At/After Discharge   Mode of Transport at Discharge Other (see comment)  (family car vs medical transport TBD)     CM met with pt and sons to introduce self, role, and to conduct initial assessment. Facesheet demographics verified. Pt and sons very pleasant. Sons report pt is complete care at baseline at [facility] and should be able to return.     Residence: Pt has resided [facility] for approximately 2 years. Has two sons local, one in [place] and one in [place].  ADL: Complete care, transfers to  with Flor Stedy Lift, incontinent care in bed (son reports OT has been trying to transition pt back to toileting with assist). Currently sponge baths.  Independent eating with set-up. Pt with some vision loss  DME: hospital/electric bed, wc, Flor Stedy Lift, shower chair, transfer board... DME available for all ADL care needs  Pharmacy: [facility] orders/provides prescriptions   PCP: William Steiner NP   Verified Insurance: Medicare Part A and Part B   Emergency Contacts: Brian Verity [phone]  Previous rehab services: SNF in [place] - only PT OT at [facility] in [place]   Transportation: Family transports by car at baseline - TBD stretcher vs family     NATALI Olguin    [facility]    or by [employer]

## 2024-12-05 NOTE — PROGRESS NOTES
Attempted to deliver and verbally explain the MOON with patient. Patient was resting. Bee Villanueva, Care Management Assistant

## 2024-12-05 NOTE — PROGRESS NOTES
[facility]  Hospitalist Group                                                                                          Hospitalist Progress Note  FELICIANO Schneider NP  Office Phone: [phone]        Date of Service:  2024  NAME:  Terence Chavez  :  1940  MRN:  [id number]       Admission Summary:   Terence Chavez is a 84 y.o. male with hx of lumbar compression fracture, hx of infarct right basal ganglia, advanced cerebral atrophy, dyslipidemia, mood disorder who presnets to ed accompanied by his family with multiple concerns.  Endorses chronic history of some deteriorating vision describing blurry vision but over the last 2 days this is acutely worsened.  He has reported to his sons that he has had increased difficulty with his vision.  Son at bedside also reports patient called him today and reported some confusion.  Patient's son states he also noted some malaise and fatigue and some convulsive like movements yesterday.  He had recurrence of same convulsion type movement while in ED and was mildly confused.  No reported falls or head injury.  Denies any alcohol or illicit drug use.  No previous history of any seizures.     The patient denies any fever, chills, chest or abdominal pain, nausea, vomiting, cough, congestion, recent illness, palpitations, or dysuria.  Remarkable vitals on ER Presentation: vss  Labs Remarkable for: ua: trace blood and ketones  ER Images: ct head and cxr: no acute process.  ER Rx: none       Interval history / Subjective:   CTA H/N with patchy high-grade stenosis of the right greater than left PCA noted  Discussed with Dr. Arellano, patient will need APT, statin and blood pressure control, currently normotensive without antihypertensives  Patient's 2 sons at bedside  Patient with progressive intermittent blurry vision over the last 6 months, more acute over the last 2 days.  Cataract surgery anywhere from 2 to 5 years ago.  Patient has difficulty  Status:[id number]:::1}      Medications Reviewed:     Current Facility-Administered Medications   Medication Dose Route Frequency    escitalopram (LEXAPRO) tablet 5 mg  5 mg Oral Daily    sodium chloride flush 0.9 % injection 5-40 mL  5-40 mL IntraVENous 2 times per day    sodium chloride flush 0.9 % injection 5-40 mL  5-40 mL IntraVENous PRN    0.9 % sodium chloride infusion   IntraVENous PRN    ondansetron (ZOFRAN-ODT) disintegrating tablet 4 mg  4 mg Oral Q8H PRN    Or    ondansetron (ZOFRAN) injection 4 mg  4 mg IntraVENous Q6H PRN    polyethylene glycol (GLYCOLAX) packet 17 g  17 g Oral Daily PRN    aspirin chewable tablet 81 mg  81 mg Oral Daily    Or    aspirin suppository 300 mg  300 mg Rectal Daily     ______________________________________________________________________  EXPECTED LENGTH OF STAY: 2  ACTUAL LENGTH OF STAY:          0                 FELICIANO Schneider NP

## 2024-12-06 VITALS
WEIGHT: 126.98 LBS | DIASTOLIC BLOOD PRESSURE: 69 MMHG | TEMPERATURE: 98.7 F | RESPIRATION RATE: 19 BRPM | HEIGHT: 69 IN | SYSTOLIC BLOOD PRESSURE: 117 MMHG | OXYGEN SATURATION: 95 % | HEART RATE: 63 BPM | BODY MASS INDEX: 18.81 KG/M2

## 2024-12-06 PROCEDURE — 6370000000 HC RX 637 (ALT 250 FOR IP): Performed by: STUDENT IN AN ORGANIZED HEALTH CARE EDUCATION/TRAINING PROGRAM

## 2024-12-06 PROCEDURE — 97165 OT EVAL LOW COMPLEX 30 MIN: CPT

## 2024-12-06 PROCEDURE — 97530 THERAPEUTIC ACTIVITIES: CPT

## 2024-12-06 PROCEDURE — 2580000003 HC RX 258: Performed by: STUDENT IN AN ORGANIZED HEALTH CARE EDUCATION/TRAINING PROGRAM

## 2024-12-06 RX ORDER — ATORVASTATIN CALCIUM 10 MG/1
10 TABLET, FILM COATED ORAL NIGHTLY
Qty: 30 TABLET | Refills: 3 | Status: SHIPPED | OUTPATIENT
Start: 2024-12-06

## 2024-12-06 RX ORDER — ASPIRIN 81 MG/1
81 TABLET, CHEWABLE ORAL DAILY
Qty: 30 TABLET | Refills: 3 | Status: SHIPPED | OUTPATIENT
Start: 2024-12-07 | End: 2024-12-06

## 2024-12-06 RX ORDER — ASPIRIN 81 MG/1
81 TABLET, CHEWABLE ORAL DAILY
Qty: 30 TABLET | Refills: 3 | Status: SHIPPED | OUTPATIENT
Start: 2024-12-07

## 2024-12-06 RX ORDER — ATORVASTATIN CALCIUM 10 MG/1
10 TABLET, FILM COATED ORAL NIGHTLY
Qty: 30 TABLET | Refills: 3 | Status: SHIPPED | OUTPATIENT
Start: 2024-12-06 | End: 2024-12-06

## 2024-12-06 RX ADMIN — ASPIRIN 81 MG CHEWABLE TABLET 81 MG: 81 TABLET CHEWABLE at 09:04

## 2024-12-06 RX ADMIN — ESCITALOPRAM OXALATE 5 MG: 10 TABLET ORAL at 09:04

## 2024-12-06 RX ADMIN — Medication 10 ML: at 09:04

## 2024-12-06 ASSESSMENT — PAIN SCALES - GENERAL
PAINLEVEL_OUTOF10: 0
PAINLEVEL_OUTOF10: 0

## 2024-12-06 NOTE — PLAN OF CARE
Problem: Safety - Adult  Goal: Free from fall injury  12/6/2024 1215 by Gumaro Ward RN  Outcome: Progressing  12/6/2024 0239 by Jeannie Feliz RN  Outcome: Progressing  Flowsheets (Taken 12/5/2024 2000)  Free From Fall Injury:   Instruct family/caregiver on patient safety   Based on caregiver fall risk screen, instruct family/caregiver to ask for assistance with transferring infant if caregiver noted to have fall risk factors     Problem: Discharge Planning  Goal: Discharge to home or other facility with appropriate resources  12/6/2024 1215 by Gumaro Ward, RN  Outcome: Progressing  12/6/2024 0239 by Jeannie Feliz RN  Outcome: Progressing  Flowsheets (Taken 12/5/2024 2000)  Discharge to home or other facility with appropriate resources:   Identify barriers to discharge with patient and caregiver   Arrange for needed discharge resources and transportation as appropriate   Identify discharge learning needs (meds, wound care, etc)   Arrange for interpreters to assist at discharge as needed   Refer to discharge planning if patient needs post-hospital services based on physician order or complex needs related to functional status, cognitive ability or social support system

## 2024-12-06 NOTE — DISCHARGE INSTRUCTIONS
Consider reassessment at [facility], new findings on exam and/or new genetic testing capabilities, might carson a definitive diagnosis.     Start baby aspirin and low-dose atorvastatin as prescribed for stroke prevention

## 2024-12-06 NOTE — PLAN OF CARE
Problem: Safety - Adult  Goal: Free from fall injury  Outcome: Progressing  Flowsheets (Taken 12/5/2024 2000)  Free From Fall Injury:   Instruct family/caregiver on patient safety   Based on caregiver fall risk screen, instruct family/caregiver to ask for assistance with transferring infant if caregiver noted to have fall risk factors     Problem: Pain  Goal: Verbalizes/displays adequate comfort level or baseline comfort level  Outcome: Progressing     Problem: Discharge Planning  Goal: Discharge to home or other facility with appropriate resources  Outcome: Progressing  Flowsheets (Taken 12/5/2024 2000)  Discharge to home or other facility with appropriate resources:   Identify barriers to discharge with patient and caregiver   Arrange for needed discharge resources and transportation as appropriate   Identify discharge learning needs (meds, wound care, etc)   Arrange for interpreters to assist at discharge as needed   Refer to discharge planning if patient needs post-hospital services based on physician order or complex needs related to functional status, cognitive ability or social support system     Problem: Skin/Tissue Integrity  Goal: Absence of new skin breakdown  Description: 1.  Monitor for areas of redness and/or skin breakdown  2.  Assess vascular access sites hourly  3.  Every 4-6 hours minimum:  Change oxygen saturation probe site  4.  Every 4-6 hours:  If on nasal continuous positive airway pressure, respiratory therapy assess nares and determine need for appliance change or resting period.  Outcome: Progressing

## 2024-12-06 NOTE — PLAN OF CARE
Problem: Physical Therapy - Adult  Goal: By Discharge: Performs mobility at highest level of function for planned discharge setting.  See evaluation for individualized goals.  Description: FUNCTIONAL STATUS PRIOR TO ADMISSION: The patient was functional at the wheelchair level and required moderate assistancefor transfers to the chair. Pt was receiving PT.    HOME SUPPORT PRIOR TO ADMISSION: The patient lived at [facility].    Physical Therapy Goals  Initiated 12/5/2024  1.  Patient will move from supine to sit and sit to supine in bed with moderate assistance within 7 day(s).    2.  Patient will perform sit to stand with moderate assistance within 7 day(s).  3.  Patient will transfer from bed to chair and chair to bed with moderate assistance using the least restrictive device within 7 day(s).      12/6/2024 1001 by Cortney Castro, SPT  Outcome: Progressing

## 2024-12-06 NOTE — PLAN OF CARE
Problem: Physical Therapy - Adult  Goal: By Discharge: Performs mobility at highest level of function for planned discharge setting.  See evaluation for individualized goals.  Description: FUNCTIONAL STATUS PRIOR TO ADMISSION: The patient was functional at the wheelchair level and required moderate assistancefor transfers to the chair. Pt was receiving PT.    HOME SUPPORT PRIOR TO ADMISSION: The patient lived at [facility].    Physical Therapy Goals  Initiated 12/5/2024  1.  Patient will move from supine to sit and sit to supine in bed with moderate assistance within 7 day(s).    2.  Patient will perform sit to stand with moderate assistance within 7 day(s).  3.  Patient will transfer from bed to chair and chair to bed with moderate assistance using the least restrictive device within 7 day(s).      Outcome: Progressing     PHYSICAL THERAPY TREATMENT    Patient: Terence Chavez (84 y.o. male)  Date: 12/6/2024  Diagnosis: TIA (transient ischemic attack) [G45.9]  Confusion [R41.0]  Stroke-like symptoms [R29.90]  Acute encephalopathy [G93.40] Stroke-like symptoms      Precautions: Fall Risk                      ASSESSMENT:  Patient continues to benefit from skilled PT services and is progressing towards goals. Patient received in bed initially apprehensive to working with therapy due to feeling weak. With increased encouragement and son's motivation patient was agreeable to transferring to chair. Overall patient requiring min A x2 for bed mobility and transfers. Once seated EOB patient demonstrating significant posterior lean requiring constant verbal cueing and mod A to maintain upright midline posture. Patient able to utilize ALFREDO UE intermittently to maintain balance, but still requiring additional external support. Utilized stedy to transfer to chair - patient able to sustain good standing balance within device and decreased posterior lean sitting perched on stedy. Once in chair patient requiring min A x2 for scooting.  Verbal cues;Manual cues  Sit to Stand: Minimum assistance;Assist X2;Additional time;Adaptive equipment  Stand to Sit: Adaptive equipment;Assist X2;Minimum assistance;Additional time  Bed to Chair: Total assistance (use of stedy)  Balance:  Balance  Sitting: Impaired  Sitting - Static: Poor (constant support)  Standing: Intact;With support   Ambulation/Gait Training:     Gait  Gait Training: No                    Pain Rating:  No indication of pain  Pain Intervention(s):       Activity Tolerance:   Fair     After treatment:   Patient left in no apparent distress sitting up in chair, Call bell within reach, Bed/ chair alarm activated, and Caregiver / family present      COMMUNICATION/EDUCATION:   The patient's plan of care was discussed with: occupational therapist and registered nurse    Patient Education  Education Given To: Patient;Family  Education Provided: Role of Therapy;Mobility Training;Fall Prevention Strategies;Transfer Training;Equipment;Plan of Care  Education Method: Demonstration;Verbal  Barriers to Learning: Vision  Education Outcome: Verbalized understanding;Continued education needed      EDDA Garay  Minutes: 30    Regarding student involvement in patient care:  A student participated in this treatment session. Per CMS Medicare statements and APTA guidelines I certify that the following was true:  1. I was present and directly observed the entire session.  2. I made all skilled judgments and clinical decisions regarding care.  3. I am the practitioner responsible for assessment, treatment, and documentation.

## 2024-12-06 NOTE — CARE COORDINATION
Transition of Care Plan:    Return to [facility] 12/6  [address], [place] [address]   - no call report needed    Transport: BLS [facility] scheduled 1pm C    RUR:  9%  Prior Level of Functioning:  Complete care, transfers to  with Flor Stedy Lift, incontinent care in bed (son reports OT has been trying to transition pt back to toileting with assist). Currently sponge baths.   Disposition:  longterm  BRIA:  12/6  Follow up appointments: PCP  DME needed: no - has all needed DME   Transportation at discharge: BLS likely  IM/IMM Medicare/ letter given: no, obs  Caregiver Contact:   Brian Chavez (Child)  [phone] (Home Phone)   Discharge Caregiver contacted prior to discharge? yes  Care Conference needed? no  Barriers to discharge: clinical status; awaiting transportation      [facility]  P: [phone]  Email: [email or web address]    1045am: Per MD, pt medically stable for dc this afternoon. CM left  for facility DON/ to discuss.     1145am: CM received call from TORSTEN Martinez regarding pt return. TORSTEN confirmed pt could return before 3pm, requested emailed dc summary. OLU orders placed on [facility] to [facility] for PT OT. Family informed and in agreement, nurse informed.     Emailed dc summary. [facility] can re-accept pt and start care.     SUSHMA Marcum

## 2024-12-06 NOTE — PROGRESS NOTES
Goals/POC: Goals not written as patient discharged from hospital prior to write up of evaluation.    OCCUPATIONAL THERAPY EVALUATION    Patient: Terence Chavez (84 y.o. male)  Date: 12/6/2024  Primary Diagnosis: TIA (transient ischemic attack) [G45.9]  Confusion [R41.0]  Stroke-like symptoms [R29.90]  Acute encephalopathy [G93.40]         Precautions: Fall Risk                  ASSESSMENT :  The patient is limited by decreased functional mobility, independence in ADLs, high-level IADLs, ROM, strength, activity tolerance, endurance, safety awareness, cognition, command following, attention/concentration, balance, vision/visual deficit, fine-motor control. Patient initially presented with deteriorating vision with acute worsening over last two days, increased confusion. Patient underwent CT of head that was negative for acute process and MRI which showed \"No acute infarction. Generalized cerebral/cerebellar parenchymal volume loss, mild chronic microvascular ischemic disease, and small chronic right basal ganglia infarction.\" Patient received semi fowlers in bed and agreeable to working with therapy with education and encouragement from therapists and patient's son. Patient transferred to [facility] and utilized Flor Stedy for transfer OOB to recliner chair. Patient uses similar lift at facility and did better with familiar device. Patient assisted with feeding once sitting in recliner, limited due to visual impairments but able to bring food to mouth once food was placed on silverware and silverware brought to patient's hand. Patient will require 1:1 feeding assist for all meals given current visual limitations.     Based on the impairments listed above patient presents below his reported baseline and primarily limited by visual deficits this date. Patient would benefit from skilled OT services during admission to improve independence with self care and functional mobility/transfers. Recommend discharge back to [facility] with HHOT

## 2024-12-06 NOTE — DISCHARGE SUMMARY
Discharge Summary       PATIENT ID: Terence Chavez  MRN: [id number]   YOB: 1940    DATE OF ADMISSION: 12/4/2024  4:48 PM    DATE OF DISCHARGE: 12/6/2024   PRIMARY CARE PROVIDER: William Steiner APRN - ROLY     ATTENDING PHYSICIAN: Elizabeth  DISCHARGING PROVIDER: Isi Johnson MD    To contact this individual call [phone] and ask the  to page.  If unavailable ask to be transferred the Adult Hospitalist Department.    CONSULTATIONS: IP CONSULT TO HOSPITALIST  IP CONSULT TO NEUROLOGY  IP CONSULT TO CASE MANAGEMENT  IP CONSULT TO STROKE COORDINATOR  IP CONSULT TO CASE MANAGEMENT    PROCEDURES/SURGERIES: * No surgery found *     ADMITTING DIAGNOSES & HOSPITAL COURSE:   Stroke-like symptoms  Seizure-like activity  Transient encephalopathy  Dyslipidemia  HTN  Mood disorder      84 y.o. male with hx of lumbar compression fracture, hx of infarct right basal ganglia, advanced cerebral atrophy, dyslipidemia, mood disorder who presnets to ed accompanied by his family with multiple concerns. Endorses chronic history of some deteriorating vision describing blurry vision but over the last 2 days this is acutely worsened. He has reported to his sons that he has had increased difficulty with his vision. Son at bedside also reports patient called him today and reported some confusion. Patient's son states he also noted some malaise and fatigue and some convulsive like movements yesterday. He had recurrence of same convulsion type movement while in ED and was mildly confused. No reported falls or head injury. Denies any alcohol or illicit drug use. No previous history of any seizures.     due to CVA on 03/2023  - CT head negative  - No seizure burden on rapid eeg or on routine EEG  - LDL 67.4, A1C 5.2. 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
